# Patient Record
Sex: FEMALE | Race: WHITE | Employment: OTHER | URBAN - NONMETROPOLITAN AREA
[De-identification: names, ages, dates, MRNs, and addresses within clinical notes are randomized per-mention and may not be internally consistent; named-entity substitution may affect disease eponyms.]

---

## 2017-10-05 ENCOUNTER — NURSING HOME VISIT (OUTPATIENT)
Dept: FAMILY MEDICINE | Facility: CLINIC | Age: 82
End: 2017-10-05
Payer: COMMERCIAL

## 2017-10-05 VITALS
SYSTOLIC BLOOD PRESSURE: 142 MMHG | BODY MASS INDEX: 25.45 KG/M2 | DIASTOLIC BLOOD PRESSURE: 74 MMHG | OXYGEN SATURATION: 93 % | HEART RATE: 76 BPM | RESPIRATION RATE: 20 BRPM | TEMPERATURE: 97.5 F | WEIGHT: 138 LBS

## 2017-10-05 DIAGNOSIS — I10 HTN (HYPERTENSION), BENIGN: ICD-10-CM

## 2017-10-05 DIAGNOSIS — J41.8 MIXED SIMPLE AND MUCOPURULENT CHRONIC BRONCHITIS (H): Primary | ICD-10-CM

## 2017-10-05 DIAGNOSIS — M62.81 GENERALIZED MUSCLE WEAKNESS: ICD-10-CM
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
## 2017-10-05 DIAGNOSIS — S42.002D CLOSED NONDISPLACED FRACTURE OF LEFT CLAVICLE WITH ROUTINE HEALING, UNSPECIFIED PART OF CLAVICLE, SUBSEQUENT ENCOUNTER: ICD-10-CM
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
## 2017-10-05 PROBLEM — J45.30 MILD PERSISTENT ASTHMA WITHOUT COMPLICATION: Status: ACTIVE | Noted: 2017-09-05

## 2017-10-05 PROBLEM — S22.080D COMPRESSION FRACTURE OF TWELFTH THORACIC VERTEBRA WITH ROUTINE HEALING: Status: ACTIVE | Noted: 2017-09-05

## 2017-10-05 PROBLEM — S42.009A CLAVICLE FRACTURE: Status: ACTIVE | Noted: 2017-09-05

## 2017-10-05 PROCEDURE — 99310 SBSQ NF CARE HIGH MDM 45: CPT | Performed by: NURSE PRACTITIONER

## 2017-10-05 RX ORDER — ALBUTEROL SULFATE 0.83 MG/ML
1 SOLUTION RESPIRATORY (INHALATION) EVERY 6 HOURS PRN
COMMUNITY
End: 2017-12-19

## 2017-10-05 RX ORDER — SENNOSIDES 8.6 MG
1 TABLET ORAL 2 TIMES DAILY PRN
COMMUNITY

## 2017-10-05 RX ORDER — IPRATROPIUM BROMIDE AND ALBUTEROL SULFATE 2.5; .5 MG/3ML; MG/3ML
1 SOLUTION RESPIRATORY (INHALATION) 2 TIMES DAILY
COMMUNITY
End: 2017-10-18

## 2017-10-05 NOTE — PROGRESS NOTES
[facility]  PRIMARY CARE PROVIDER AND CLINIC:  Tania Grullon [facility]  [address] [facility] [address] / [place] [address]  Chief Complaint   Patient presents with     Clinic Care Coordination - Initial       HPI:    Benson Kapoor is a 89 year old  (5/20/1925),admitted to the [facility]  from [facility] on 10/4/17..  Last hospital stay 9/5/17  through 9/6/17 at [facility].  Admitted to this facility for  rehab, medical management and nursing care.  HPI information obtained from: facility chart records, facility staff, patient report and Care Everywhere [facility] chart review.  Current issues are:      .   BRIEF HOSPITAL COURSE from [facility] Records: This 89 y.o. female with a history of hypertension, asthma presented with a fall today. She notes that she went to the bathroom to use the toilet. She has had some urinary frequency. She was trying to transfer from the toilet ot the wheelchair and the chair tipped and she fell. Her son was able to get her up. She has been feeling weaker than usual. She was not able to ambulate - is is usually able to walk in the home. She was brought in due to increased weakness. She had pains in the left arm and clavicle.      In ED she was noted to have a UTI. SHe had CT scanning done that showed a left clavicle fracture and also a new appearing T12 compression fracture. She was admitted to observation status for treatment of the uti and pain management.     Following admission, she was started on prednisone and nebs for concerns of wheezing. She has a history of asthma, but this may also represent COPD based on her age. Today, she has no wheezing or shortness of breath at all. Will treat with 4 more days of prednisone and use the nebs as needed. She will need two more doses of levofloxacin for the UTI. Her pain is actually quite minimal from the clavicle fracture. She is not complaining of back pain, so the  compression fracture may be old.     She is not able to care for herself at home. Her family is not able to care for her well. She would prefer to be in a nursing home. She wanted to go to the nursing home in [place], but the beds are full there so she has acceptance in [place] and more reluctantly consents to go there. She just wanted to be closer to her family. She will be [facility] to [facility] in stable condition.       She was transferred from [facility] to [place] to complete her rehab and be closer to her family   She is doing well  Denies pain       CODE STATUS/ADVANCE DIRECTIVES DISCUSSION:   DNR / DNI  Patient's living condition: lives [place] family, 2 sons [place]     ALLERGIES:Review of patient's allergies indicates no known allergies.  PAST MEDICAL HISTORY:  has a past medical history of A fib; Anemia due to blood loss, acute (9/5/2012); DJD (degenerative joint disease); and HTN.  PAST SURGICAL HISTORY:  has a past surgical history that includes surgical history of - and hysterectomy, cervix status unknown.  FAMILY HISTORY: family history is not on file.  SOCIAL HISTORY:  reports that she has never smoked. She has never used smokeless tobacco. She reports that she does not drink alcohol or use illicit drugs.    Post Discharge Medication Reconciliation Status: discharge medications reconciled and changed, per note/orders (see AVS).  Current Outpatient Prescriptions   Medication Sig Dispense Refill     albuterol (2.5 MG/3ML) 0.083% neb solution Take 1 vial by nebulization every 6 hours as needed for shortness of breath / dyspnea or wheezing       ipratropium - albuterol 0.5 mg/2.5 mg/3 mL (DUONEB) 0.5-2.5 (3) MG/3ML neb solution Take 1 vial by nebulization 2 times daily And every 4hrs PRN       OXYCODONE HCL PO Take 5mg by mouth every 6hrs for pain 1-5/10 and 10mg for pain 6-10/10       DULoxetine HCl (CYMBALTA PO) Take 30 mg by mouth daily       sennosides (SENOKOT) 8.6 MG  tablet Take 1 tablet by mouth daily       Magnesium Hydroxide (MILK OF MAGNESIA PO) Take 30 mLs by mouth as needed.       Calcium-Vitamin D (CALTRATE 600 PLUS-VIT D OR) Take 1 tablet by mouth 2 times daily.       alum & mag hydroxide-simethicone (MYLANTA/MAALOX) 200-200-20 MG/5ML SUSP Take 30 mLs by mouth every 2 hours as needed        acetaminophen (TYLENOL EX ST ARTHRITIS PAIN) 500 MG tablet Take 650 mg by mouth 3 times daily And 650 mg every 4 hours prn         ROS:  10 point ROS of systems including Constitutional, Eyes, Respiratory, Cardiovascular, Gastroenterology, Genitourinary, Integumentary, Muscularskeletal, Psychiatric were all negative except for pertinent positives noted in my HPI.    Exam:  /74  Pulse 76  Temp 97.5  F (36.4  C)  Resp 20  Wt 138 lb (62.6 kg)  SpO2 93%  BMI 25.45 kg/m2  GENERAL APPEARANCE:  Alert, in no distress  EYES:  EOM, conjunctivae, lids, pupils and irises normal  RESP:  respiratory effort and palpation of chest normal, lungs clear to auscultation , no respiratory distress  CV:  Palpation and auscultation of heart done , regular rate and rhythm, no murmur, rub, or gallop  ABDOMEN:  normal bowel sounds, soft, nontender, no hepatosplenomegaly or other masses  M/S:   Gait and station abnormal sitting in wheelchair, can ambulate with assistance  Digits and nails normal  SKIN:  Inspection of skin and subcutaneous tissue baseline  NEURO:   Cranial nerves 2-12 are normal tested and grossly at patient's baseline  PSYCH:  normal insight, judgement and memory, affect and mood normal    Lab/Diagnostic data:      CBC WITH AUTO DIFFERENTIAL (09/06/2017 4:30 AM)  Only the most recent of 2 results within the time period is included.    CBC WITH AUTO DIFFERENTIAL (09/06/2017 4:30 AM)   Component Value Ref Range   WHITE BLOOD COUNT 6.9 3.4 - 9.8 thou/cu mm   RED BLOOD COUNT 4.39 4.09 - 5.34 mil/cu mm   HEMOGLOBIN 13.6 12.0 - 16.0 g/dL   HEMATOCRIT 41.1 37.0 - 47.0 %   MCV 94 80 - 97 fL    MCH 31.0 27.2 - 32.8 pg   MCHC 33.1 32.0 - 36.0 g/dL   RDW 13.4 11.5 - 14.5 %   PLATELET COUNT 161 150 - 450 thou/cu mm   MPV 11.2 8.5 - 12.0 fL   NRBC 0.0 <=0.0 %   NEUTROPHILS 80.1 (H) 42.4 - 72.3 %   LYMPHOCYTES 10.6 (L) 16.6 - 42.8 %   MONOCYTES 7.8 4.8 - 13.6 %   EOSINOPHILS 0.9 0.0 - 6.7 %   BASOPHILS 0.3 <1.1 %   IMMATURE GRANULOCYTES(METAS,MYELOS,PROS) 0.3 0.0 - 0.4 %   ABSOLUTE NEUTROPHILS 5.5 1.4 - 6.2 thou/cu mm   ABSOLUTE LYMPHOCYTES 0.7 (L) 0.8 - 3.1 thou/cu mm   ABSOLUTE MONOCYTES 0.5 0.2 - 1.0 thou/cu mm   ABSOLUTE EOSINOPHILS 0.06 0.00 - 0.43 thou/cu mm   ABSOLUTE BASOPHILS 0.02 <0.07 thou/cu mm   ABSOLUTE IMMATURE GRANULOCYTES(METAS,MYELOS,PROS) 0.02 <0.03 thou/cu mm     CBC WITH AUTO DIFFERENTIAL (09/06/2017 4:30 AM)   Specimen Performing Laboratory   Blood [place] [address] [facility] [address]    [place] [address]     Back to top of Results      VITAMIN D 25 (DEFICIENCY) (09/06/2017 4:30 AM)  VITAMIN D 25 (DEFICIENCY) (09/06/2017 4:30 AM)   Component Value Ref Range   VITAMIN D TOTAL AFL 33.7 30.0 - 80.0 ng/mL     VITAMIN D 25 (DEFICIENCY) (09/06/2017 4:30 AM)   Specimen Performing Laboratory   Blood [facility]    [address]    [place] [address]       VITAMIN D 25 (DEFICIENCY) (09/06/2017 4:30 AM)   Narrative   Deficiency:             < 20 ng/mL     Insufficiency:       20 - 29 ng/mL     Sufficiency:         30 - 80 ng/mL    Possible Toxicity:      > 80 ng/mL      Back to top of Results      TROPONIN I (09/06/2017 4:30 AM)  Only the most recent of 3 results within the time period is included.    TROPONIN I (09/06/2017 4:30 AM)   Component Value Ref Range   TROPONIN I FLH 0.043 <0.056 ng/mL     TROPONIN I (09/06/2017 4:30 AM)   Specimen Performing Laboratory   Blood [facility]    [address]    [place] [address]     Back to top of Results      CBC and Differential (09/06/2017 4:30 AM)  Only the most recent of 2 results within the time period is  included.    CBC and Differential (09/06/2017 4:30 AM)   Specimen Performing Laboratory   Blood        CBC and Differential (09/06/2017 4:30 AM)   Narrative   The following orders were created for panel order CBC and Differential.    Procedure                               Abnormality         Status                       ---------                               -----------         ------                       CBC WITH AUTO DIFFERENTIAL[[id number]]   Abnormal            Final result                     Please view results for these tests on the individual orders.     Back to top of Results      Magnesium (09/06/2017 4:30 AM)  Magnesium (09/06/2017 4:30 AM)   Component Value Ref Range   MAGNESIUM 1.6 (L) 1.8 - 2.4 mg/dL     Magnesium (09/06/2017 4:30 AM)   Specimen Performing Laboratory   Blood [facility]    [address]    [place] [address]     Back to top of Results      Basic Metabolic Panel (09/06/2017 4:30 AM)  Only the most recent of 2 results within the time period is included.    Basic Metabolic Panel (09/06/2017 4:30 AM)   Component Value Ref Range   SODIUM 143 136 - 145 mmol/L   POTASSIUM 3.8 3.5 - 5.1 mmol/L   CHLORIDE 105 98 - 107 mmol/L   CO2,TOTAL 29 21 - 32 mmol/L   ANION GAP 9 5 - 15   GLUCOSE 100 70 - 100 mg/dL   CALCIUM 8.5 8.3 - 9.9 mg/dL   BUN 19 (H) 7 - 18 mg/dL   CREATININE 0.71 0.60 - 1.30 mg/dL   BUN/CREAT RATIO  27 (H) 12 - 20    GFR if African American >60 >60 ml/min/1.73m2   GFR if not African American >60 >60 ml/min/1.73m2     Basic Metabolic Panel (09/06/2017 4:30 AM)   Specimen Performing Laboratory   Blood [facility]    [address]    [place] [address]     Back to top of Results      MRSA DNA PCR (09/05/2017 8:20 PM)  MRSA DNA PCR (09/05/2017 8:20 PM)   Component Value Ref Range   MRSA DNA PCR Negative Negative     MRSA DNA PCR (09/05/2017 8:20 PM)   Specimen Performing Laboratory   Other - Nasal [facility]    [address]     [place] [address]     Back to top of Results      CT SPINE CERVICAL WO (09/05/2017 5:38 PM)  CT SPINE CERVICAL WO (09/05/2017 5:38 PM)   Narrative   INDICATION:    Fall        TECHNIQUE:    CT cervical spine without contrast.        COMPARISON:    None available        FINDINGS:    There is an exaggerated cervicothoracic curvature. The craniocervical and atlantoaxial alignments are near anatomical. There is no definite evidence of an acute cervical spine fracture. There is mild anterior compression of the T2 and T3 vertebral bodies which could be chronic. There is a fracture of the medial left clavicle. There is no significant precervical soft tissue swelling. There are degenerative changes at multiple levels.  Multiple thyroid low-density lesions are seen, as well as right thyroid calcifications.         IMPRESSION:    No definite evidence of an acute cervical spine fracture. Mild anterior compression deformities of the T2 and T3 vertebral bodies could be chronic. A fracture of the medial left clavicle.     Multiple thyroid lesions and calcifications.  Followup with sonography.        Please note that all CT scans at this facility use dose modulation, iterative reconstruction, and/or weight-based dosing when appropriate to reduce radiation dose to as low as reasonably achievable.        Dictated by Ciro May MD @ Sep  5 2017  6:07PM        Signed by Dr. Ciro May @ Sep  5 2017  6:23PM       CT SPINE CERVICAL WO (09/05/2017 5:38 PM)   Procedure Note   Interface, Powerscribe - 09/05/2017 6:24 PM CDT    INDICATION:  Fall    TECHNIQUE:  CT cervical spine without contrast.    COMPARISON:  None available    FINDINGS:  There is an exaggerated cervicothoracic curvature. The craniocervical and atlantoaxial alignments are near anatomical. There is no definite evidence of an acute cervical spine fracture. There is mild anterior compression of the T2 and T3 vertebral bodies which could be chronic. There is a fracture of the  medial left clavicle. There is no significant precervical soft tissue swelling. There are degenerative changes at multiple levels. Multiple thyroid low-density lesions are seen, as well as right thyroid calcifications.     IMPRESSION:  No definite evidence of an acute cervical spine fracture. Mild anterior compression deformities of the T2 and T3 vertebral bodies could be chronic. A fracture of the medial left clavicle.   Multiple thyroid lesions and calcifications. Followup with sonography.    Please note that all CT scans at this facility use dose modulation, iterative reconstruction, and/or weight-based dosing when appropriate to reduce radiation dose to as low as reasonably achievable.    Dictated by Ciro May MD @ Sep 5 2017 6:07PM    Signed by Dr. Ciro May @ Sep 5 2017 6:23PM     Back to top of Results      CT HEAD BRAIN WO (09/05/2017 5:36 PM)  CT HEAD BRAIN WO (09/05/2017 5:36 PM)   Narrative   INDICATION:    Fall        TECHNIQUE:    CT head without contrast.        COMPARISON:    None available         FINDINGS:    This study is degraded by artifact.     The ventricles and sulci demonstrate normal configuration and size for the patient`s age.  There is no mass effect or midline shift.  White matter hypodensities are suggestive of chronic small vessel ischemic changes. There is no definite evidence of a gross acute intracranial hemorrhage or loss of gray-white differentiation.     No acute calvarial fracture is seen.     The visualized paranasal sinuses and mastoid air cells are clear. There are postsurgical changes in both globes.         IMPRESSION:    Artifact degraded study without definite evidence of a gross acute intracranial hemorrhage or loss of gray-white differentiation.  If indicated clinically, followup with better sedation and technique.        Please note that all CT scans at this facility use dose modulation, iterative reconstruction, and/or weight-based dosing when appropriate to reduce  radiation dose to as low as reasonably achievable.        Dictated by Ciro May MD @ Sep  5 2017  6:07PM        Signed by Dr. Ciro May @ Sep  5 2017  6:13PM       CT HEAD BRAIN WO (09/05/2017 5:36 PM)   Procedure Note   Interface, Powerscribe - 09/05/2017 6:13 PM CDT    INDICATION:  Fall    TECHNIQUE:  CT head without contrast.    COMPARISON:  None available     FINDINGS:  This study is degraded by artifact.   The ventricles and sulci demonstrate normal configuration and size for the patient`s age. There is no mass effect or midline shift. White matter hypodensities are suggestive of chronic small vessel ischemic changes. There is no definite evidence of a gross acute intracranial hemorrhage or loss of gray-white differentiation.   No acute calvarial fracture is seen.   The visualized paranasal sinuses and mastoid air cells are clear. There are postsurgical changes in both globes.     IMPRESSION:  Artifact degraded study without definite evidence of a gross acute intracranial hemorrhage or loss of gray-white differentiation. If indicated clinically, followup with better sedation and technique.    Please note that all CT scans at this facility use dose modulation, iterative reconstruction, and/or weight-based dosing when appropriate to reduce radiation dose to as low as reasonably achievable.    Dictated by Ciro May MD @ Sep 5 2017 6:07PM    Signed by Dr. Ciro May @ Sep 5 2017 6:13PM     Back to top of Results      CT CHEST ABDOMEN PELVIS W (09/05/2017 5:27 PM)  CT CHEST ABDOMEN PELVIS W (09/05/2017 5:27 PM)   Impressions   A medial left clavicular fracture. Multiple spinal compression     deformities, with a suspected acute component in the T12 compression     deformity.     No evidence of a gross acute visceral or vascular injury in the chest,     abdomen or pelvis.     Cholelithiasis. An 8 millimeter soft tissue density at the gallbladder     wall, nonspecific. Correlate with sonographic evaluation.     Other  findings as above.        Please note that all CT scans at this facility use dose modulation,     iterative reconstruction, and/or weight-based dosing when appropriate to     reduce radiation dose to as low as reasonably achievable.        Dictated by Ciro May MD @ Sep 5 2017 6:23PM    Signed by: Ciro May MD @9/5/2017 6:42:12 PM               CT CHEST ABDOMEN PELVIS W (09/05/2017 5:27 PM)   Narrative   INDICATION:    Fall        TECHNIQUE:    CT chest, abdomen and pelvis acquired with IV contrast.        COMPARISON:    None available        FINDINGS:    Chest:    Cardiovascular structures: Normal cardiac size. Mild ectasia of the     ascending aorta measuring 3.7 centimeters. Atherosclerotic changes.     Mediastinum and latonia: No mass or adenopathy. A small hiatal hernia.     Lungs: No consolidation. No pneumothorax. Apparent subtle ill-defined     right lower lung ground-glass opacities could be related to minor     compressive changes.     Pleura and pericardium: No effusions.     Chest wall and axilla: No mass or adenopathy. Thyroid lesions and     calcifications.     Bones: A medial left clavicular fracture. Chronic rib deformities.     Anterior spinal ankylosis. A severe anterior compression deformity of the     T12 vertebral body demonstrating a lucent area seen on the sagittal     reformats, which is concerning for an acute component. Degenerative     changes in both shoulders.     Abdomen and Pelvis:    Liver: A subcentimeter left hepatic low-density lesion, too small to     characterize.     Spleen: Unremarkable.     Pancreas: Unremarkable.     Gallbladder and bile ducts: Cholelithiasis with borderline gallbladder     wall prominence which could be related to incomplete distention. An 8     millimeter soft tissue nodule along the gallbladder wall on image 151.     Pericholecystic hypodensity appears to be related to fat. A dilated CBD     measuring 1.2 centimeters in diameter.     Adrenal glands: Mild  adrenal thickening.     Kidneys: No hydronephrosis. Irregular densities in the renal lower pole of     these could represent nonobstructive calcifications or early contrast     excretion.     GI tract: No high-grade mechanical bowel obstruction. Fluid-filled small     bowel segments are nonspecific. The appendix is not seen. No significant     pericolonic changes. Stool throughout the colon.     Vascular structures: Arthrosclerotic changes.     Lymph nodes: Unremarkable.     Miscellaneous: Unremarkable. No free air or significant free fluid.     Pelvic Organs: Obscured by artifact. The uterus is not seen.     Bones: A left hip arthroplasty and a right dynamic hip screw. Multiple     lumbar compression deformities, most pronounced at the L2 level.          Back to top of Results    EKG 12 LEAD (09/05/2017 4:45 PM)  Back to top of Results      LACTATE,BLOOD (09/05/2017 4:35 PM)  LACTATE,BLOOD (09/05/2017 4:35 PM)   Component Value Ref Range   LACTATE,BLOOD 1.1 0.4 - 2.0 mmol/L     LACTATE,BLOOD (09/05/2017 4:35 PM)   Specimen Performing Laboratory   Blood [facility]    [address]    [place] [address]     Back to top of Results      AMMONIA (09/05/2017 4:35 PM)  AMMONIA (09/05/2017 4:35 PM)   Component Value Ref Range   AMMONIA 11 11 - 35 umol/L     AMMONIA (09/05/2017 4:35 PM)   Specimen Performing Laboratory   Blood [facility]    [address]    [place] [address]     Back to top of Results      HEPATIC FUNCTION PANEL (09/05/2017 4:35 PM)  HEPATIC FUNCTION PANEL (09/05/2017 4:35 PM)   Component Value Ref Range   ALBUMIN 3.5 3.4 - 5.0 g/dL   PROTEIN,TOTAL 7.7 6.4 - 8.2 g/dL   GLOBULIN  4.2 (H) 2.3 - 3.5 g/dL   A/G RATIO 0.8 (L) 1.0 - 2.0    BILIRUBIN,TOTAL 0.9 0.0 - 1.0 mg/dL   BILIRUBIN,DIRECT 0.3 0.0 - 0.3 mg/dL   BILIRUBIN,INDIRECT  0.6 <=0.9 mg/dL   ALT (SGPT) FLH 25 12 - 65 IU/L   AST (SGOT) FLH 35 15 - 37 IU/L   ALK PHOSPHATASE FLH 89 46 - 116 IU/L     HEPATIC FUNCTION  PANEL (09/05/2017 4:35 PM)   Specimen Performing Laboratory   Blood [facility]    [address]    [place] [address]     Back to top of Results      URINALYSIS MICROSCOPIC (09/05/2017 4:10 PM)  URINALYSIS MICROSCOPIC (09/05/2017 4:10 PM)   Component Value Ref Range   RBC 3-5 (A) 0-2, None Seen /HPF   WBC 26-50 (A) 0-2, 3-5, None Seen /HPF   BACTERIA  Moderate (A) None Seen, Rare, Occasional, Few Bacteria/HPF     URINALYSIS MICROSCOPIC (09/05/2017 4:10 PM)   Specimen Performing Laboratory   Urine [facility]    [address]    [place] [address]     Back to top of Results      URINALYSIS W REFLEX MICROSCOPIC IF POSITIVE (09/05/2017 4:10 PM)  URINALYSIS W REFLEX MICROSCOPIC IF POSITIVE (09/05/2017 4:10 PM)   Component Value Ref Range   COLOR  Yellow Yellow Color   CLARITY  Slightly Cloudy (A) Clear Clarity   SPECIFIC GRAVITY,URINE  1.010 1.010, 1.015, 1.020, 1.025    PH,URINE  >=9.0 (A) 6.0, 7.0, 8.0, 5.5, 6.5, 7.5, 8.5    UROBILINOGEN,QUALITATIVE  Normal Normal EU/dl   PROTEIN, URINE 100 (A) Negative mg/dL   GLUCOSE, URINE Negative Negative mg/dL   KETONES,URINE  Negative Negative mg/dL   BILIRUBIN,URINE  Negative Negative    OCCULT BLOOD,URINE  Large (A) Negative    NITRITE  Positive (A) Negative    LEUKOCYTE ESTERASE  Large (A) Negative      URINALYSIS W REFLEX MICROSCOPIC IF POSITIVE (09/05/2017 4:10 PM)   Specimen Performing Laboratory   Urine [facility]    [address]    [place] [address]     Back to top of Results      XR HIP 2 OR 3 VIEWS W PELVIS LEFT (09/04/2017 3:46 PM)  XR HIP 2 OR 3 VIEWS W PELVIS LEFT (09/04/2017 3:46 PM)   Narrative   Indication:    Fell.  Left hip injury.        Technique:    AP view of the pelvis as well as AP and lateral projections of the left hip.        Comparison:    11/14/2012.        Findings:    No acute fracture. Left total hip arthroplasty, as before. Subtle acetabular protrusion, increased since 2012.  Prosthetic  components well-seated and aligned.  Compression screw and plate at the right hip, as before.  Moderate to advanced right hip osteoarthritis, as before.  Moderate osteoarthritis at the symphysis pubis and sacroiliac joints.        Impression:    Negative for acute traumatic abnormality.            Dictated by Oz Gaffney MD @ Sep  4 2017  4:03PM        Signed by Dr. Oz Gaffney @ Sep  4 2017  4:06PM       XR HIP 2 OR 3 VIEWS W PELVIS LEFT (09/04/2017 3:46 PM)   Procedure Note   Interface, Powerscribe - 09/04/2017 4:06 PM CDT    Indication:  Fell. Left hip injury.    Technique:  AP view of the pelvis as well as AP and lateral projections of the left hip.    Comparison:  11/14/2012.    Findings:  No acute fracture. Left total hip arthroplasty, as before. Subtle acetabular protrusion, increased since 2012. Prosthetic components well-seated and aligned. Compression screw and plate at the right hip, as before. Moderate to advanced right hip osteoarthritis, as before. Moderate osteoarthritis at the symphysis pubis and sacroiliac joints.    Impression:  Negative for acute traumatic abnormality.      Dictated by Oz Gaffney MD @ Sep 4 2017 4:03PM    Signed by Dr. Oz Gaffney @ Sep 4 2017 4:06PM     Back to top of Results      XR SHOULDER 3 VIEWS LEFT (09/04/2017 3:46 PM)  XR SHOULDER 3 VIEWS LEFT (09/04/2017 3:46 PM)   Narrative   Indication:    Left arm injury.        Technique:    Three views of the left shoulder.        Comparison:    08/27/2012.        Findings:    No fracture or dislocation.  Osteoporosis.  Presumed advanced degenerative changes at the glenohumeral joint and at least mild osteoarthritis at the AC joint.        Impression:    1. Negative for acute traumatic abnormality.    2. Degenerative changes and osteoporosis.            Dictated by Oz Gaffney MD @ Sep  4 2017  4:01PM        Signed by Dr. Oz Gaffney @ Sep  4 2017  4:02PM       XR SHOULDER 3 VIEWS LEFT (09/04/2017 3:46 PM)   Procedure Note    Interface, Powerscribe - 09/04/2017 4:04 PM CDT    Indication:  Left arm injury.    Technique:  Three views of the left shoulder.    Comparison:  08/27/2012.    Findings:  No fracture or dislocation. Osteoporosis. Presumed advanced degenerative changes at the glenohumeral joint and at least mild osteoarthritis at the AC joint.    Impression:  1. Negative for acute traumatic abnormality.  2. Degenerative changes and osteoporosis.      Dictated by Oz Gaffney MD @ Sep 4 2017 4:01PM    Signed by Dr. 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 patient is stable and continue without pharmacological invention with routine assessment.         Orders:  1.  DC  Colace  2.  Start Senna S 1 tab daily for constipation    (colace is not very effective by itself for treatment of constipation, will add senna and monitor)     Total time spent with patient visit at the skilled nursing facility was 35 min including patient visit and review of past records. Greater than 50% of total time spent with counseling and coordinating care.      Electronically signed by:  Rajni Shah NP

## 2017-10-10 ENCOUNTER — NURSING HOME VISIT (OUTPATIENT)
Dept: FAMILY MEDICINE | Facility: CLINIC | Age: 82
End: 2017-10-10
Payer: COMMERCIAL

## 2017-10-10 VITALS
OXYGEN SATURATION: 91 % | RESPIRATION RATE: 17 BRPM | SYSTOLIC BLOOD PRESSURE: 114 MMHG | WEIGHT: 139.5 LBS | HEART RATE: 92 BPM | DIASTOLIC BLOOD PRESSURE: 73 MMHG | TEMPERATURE: 97.4 F | BODY MASS INDEX: 25.72 KG/M2

## 2017-10-10 DIAGNOSIS — K62.5 RECTAL BLEEDING: Primary | ICD-10-CM

## 2017-10-10 DIAGNOSIS — K64.4 EXTERNAL HEMORRHOIDS: ICD-10-CM

## 2017-10-10 PROCEDURE — 99309 SBSQ NF CARE MODERATE MDM 30: CPT | Performed by: NURSE PRACTITIONER

## 2017-10-10 NOTE — MR AVS SNAPSHOT
"              After Visit Summary   10/10/2017    Benson Kapoor    MRN: [id number]           Patient Information     Date Of Birth          5/20/1925        Visit Information        Provider Department      10/10/2017 8:40 AM Rajni Shah NP [facility]        Today's Diagnoses     Rectal bleeding    -  1    External hemorrhoids           Follow-ups after your visit        Your next 10 appointments already scheduled     Oct 18, 2017  1:00 PM CDT   Nursing Home with Leida Johnson MD   [facility] ([facility])    [address] [place] [id number] [phone]              Who to contact     If you have questions or need follow up information about today's clinic visit or your schedule please contact [facility] directly at [phone].  Normal or non-critical lab and imaging results will be communicated to you by Chainhart, letter or phone within 4 business days after the clinic has received the results. If you do not hear from us within 7 days, please contact the clinic through MyChart or phone. If you have a critical or abnormal lab result, we will notify you by phone as soon as possible.  Submit refill requests through Chibwe or call your pharmacy and they will forward the refill request to us. Please allow 3 business days for your refill to be completed.          Additional Information About Your Visit        Chainhart Information     Chibwe lets you send messages to your doctor, view your test results, renew your prescriptions, schedule appointments and more. To sign up, go to [email or web address] . Click on \"Log in\" on the left side of the screen, which will take you to the Welcome page. Then click on \"Sign up Now\" on the right side of the page.     You will be asked to enter the access code listed below, as well as some personal information. Please follow the directions to create your username and password.     Your access code " is: [email or web address]  Expires: 1/10/2018  2:37 PM     Your access code will  in 90 days. If you need help or a new code, please call your [facility] clinic or [phone].        Care EveryWhere ID     This is your Care EveryWhere ID. This could be used by other organizations to access your [facility] medical records  [id number]        Your Vitals Were     Pulse Temperature Respirations Pulse Oximetry BMI (Body Mass Index)       92 97.4  F (36.3  C) 17 91% 25.72 kg/m2        Blood Pressure from Last 3 Encounters:   10/10/17 114/73   10/05/17 142/74   13 101/54    Weight from Last 3 Encounters:   10/10/17 139 lb 8 oz (63.3 kg)   10/05/17 138 lb (62.6 kg)   13 147 lb (66.7 kg)              Today, you had the following     No orders found for display         Today's Medication Changes          These changes are accurate as of: 10/10/17 11:59 PM.  If you have any questions, ask your nurse or doctor.               Stop taking these medicines if you haven't already. Please contact your care team if you have questions.     OXYCODONE HCL PO                    Primary Care Provider Office Phone # Fax #    Nppxcjz Stephy Grullon [phone]       [address] [address]        Equal Access to Services     [facility]: Hadii cristóbal Diaz, waaxda norma, qaybta bernice coyle . So [facility] [phone].    ATENCIÓN: Si habla español, tiene a isaac disposición servicios gratuitos de asistencia lingüística. Llame al [phone].    We comply with applicable federal civil rights laws and [place] laws. We do not discriminate on the basis of race, color, national origin, age, disability, sex, sexual orientation, or gender identity.            Thank you!     Thank you for choosing [facility]  for your care. Our goal is always to provide you with excellent care. Hearing back from our patients is one  way we can continue to improve our services. Please take a few minutes to complete the written survey that you may receive in the mail after your visit with us. Thank you!             Your Updated Medication List - Protect others around you: Learn how to safely use, store and throw away your medicines at www.disposemymeds.org.          This list is accurate as of: 10/10/17 11:59 PM.  Always use your most recent med list.                   Brand Name Dispense Instructions for use Diagnosis    albuterol (2.5 MG/3ML) 0.083% neb solution      Take 1 vial by nebulization every 6 hours as needed for shortness of breath / dyspnea or wheezing        alum & mag hydroxide-simethicone 200-200-20 MG/5ML Susp suspension    MYLANTA/MAALOX     Take 30 mLs by mouth every 2 hours as needed        CALTRATE 600 PLUS-VIT D OR      Take 1 tablet by mouth 2 times daily.        CYMBALTA PO      Take 30 mg by mouth daily        ipratropium - albuterol 0.5 mg/2.5 mg/3 mL 0.5-2.5 (3) MG/3ML neb solution    DUONEB     Take 1 vial by nebulization 2 times daily And every 4hrs PRN        MILK OF MAGNESIA PO      Take 30 mLs by mouth as needed.        sennosides 8.6 MG tablet    SENOKOT     Take 1 tablet by mouth daily        TYLENOL EX ST ARTHRITIS PAIN 500 MG tablet   Generic drug:  acetaminophen      Take 650 mg by mouth 3 times daily And 650 mg every 4 hours prn

## 2017-10-10 NOTE — PROGRESS NOTES
[facility]    Chief Complaint   Patient presents with     Nursing Home Acute       HPI:    Benson Kapoor is a 89 year old  (5/20/1925), who is being seen today for an episodic care visit at [facility].    HPI information obtained from: facility chart records, facility staff and patient report. Today's concern is:    Asked by nursing to see patient as they felt that she has a prolapsed rectum   Noted some blood in her stool   Patient reports that she has a long standing history of this   She denies any rectal pain   Reports occasional constipation        REVIEW OF SYSTEMS:  4 point ROS including Respiratory, CV, GI and , other than that noted in the HPI,  is negative    /73  Pulse 92  Temp 97.4  F (36.3  C)  Resp 17  Wt 139 lb 8 oz (63.3 kg)  SpO2 91%  BMI 25.72 kg/m2  GENERAL APPEARANCE:  Alert, in no distress  RESP:  respiratory effort and palpation of chest normal, auscultation of lungs clear, no respiratory distress  CV:  Palpation and auscultation of heart done , rate and rhythm WNL, no murmur, no peripheral edema  ABDOMEN:  normal bowel sounds, soft, nontender, no hepatosplenomegaly or other masses  RECTAL: no prolapsed rectum appreciated , external hemorrhoids noted on exam     ASSESSMENT/PLAN:  Rectal bleeding    External hemorrhoids         Orders:  1.  DC Oxycodone--not using  2.  HgB (rectal bleeding), BMP (CHF) next lab day  Total time spent with patient visit at the skilled nursing facility was 25 min including patient visit and review of past records. Greater than 50% of total time spent with counseling and coordinating care due to impaired memory.    Rajni Shah NP

## 2017-10-12 ENCOUNTER — HOSPITAL LABORATORY (OUTPATIENT)
Facility: OTHER | Age: 82
End: 2017-10-12

## 2017-10-12 LAB
ANION GAP SERPL CALCULATED.3IONS-SCNC: 5 MMOL/L (ref 3–14)
BUN SERPL-MCNC: 12 MG/DL (ref 7–30)
CALCIUM SERPL-MCNC: 8.8 MG/DL (ref 8.5–10.1)
CHLORIDE SERPL-SCNC: 103 MMOL/L (ref 94–109)
CO2 SERPL-SCNC: 30 MMOL/L (ref 20–32)
CREAT SERPL-MCNC: 0.48 MG/DL (ref 0.52–1.04)
GFR SERPL CREATININE-BSD FRML MDRD: >90 ML/MIN/1.7M2
GLUCOSE SERPL-MCNC: 84 MG/DL (ref 70–99)
HGB BLD-MCNC: 12.8 G/DL (ref 11.7–15.7)
POTASSIUM SERPL-SCNC: 4.3 MMOL/L (ref 3.4–5.3)
SODIUM SERPL-SCNC: 138 MMOL/L (ref 133–144)

## 2017-10-16 VITALS
RESPIRATION RATE: 18 BRPM | SYSTOLIC BLOOD PRESSURE: 132 MMHG | OXYGEN SATURATION: 93 % | HEART RATE: 83 BPM | WEIGHT: 129.6 LBS | TEMPERATURE: 97.6 F | BODY MASS INDEX: 23.9 KG/M2 | DIASTOLIC BLOOD PRESSURE: 72 MMHG

## 2017-10-16 NOTE — PROGRESS NOTES
[facility]  PRIMARY CARE PROVIDER AND CLINIC:  Yadi Tania Keyes  [facility]  [address] [place] [address]  Chief Complaint   Patient presents with     Clinic Care Coordination - Initial       HPI:    Benson Kapoor is a 89 year old  (5/20/1925),admitted to the [facility]  from [facility] on 10/4/17. Last Hospital stay 9/5/17  through 9/6/17 at [facility].  Admitted to this facility for  rehab, medical management and nursing care.  HPI information obtained from: patient report and [facility] chart review.      Interval History:  - Pt with significant PMH of HTN, fell at home, sustained a fx to left clavicle and acute T12 and T1-T2 (? Chronic) compression fx,  admitted to the above hospital, was found to have UTI- treated with levaquin. Fx managed conservatively.   - Hospitalization was c/w Resp. sx started on neb and prednisone  - Transferred to [facility] then to this facility for rehab.  At TCU:  - blood with stool, reportedly has chronic hx, on exam no rectal prolapse noted. Hb came back 12.8.     Current issues are:      - Seen and examined.   - Reports no pain now, neither in the shoulder or back.   - Reports wheezing if miss nebs. No chest pain, reports occasional cough.   - reports sleep, appetite are fine. BM is too soft and has to rush to the bathroom .   - reports therapy is going well, self propel.     CODE STATUS/ADVANCE DIRECTIVES DISCUSSION:   DNR / DNI  Patient's living condition: lives with family, [place]     ALLERGIES:Review of patient's allergies indicates no known allergies.  PAST MEDICAL HISTORY:  has a past medical history of A fib; Anemia due to blood loss, acute (9/5/2012); DJD (degenerative joint disease); and HTN.  PAST SURGICAL HISTORY:  has a past surgical history that includes surgical history of - and hysterectomy, cervix status unknown.  FAMILY HISTORY: family history is not on file.  SOCIAL  HISTORY:  reports that she has never smoked. She has never used smokeless tobacco. She reports that she does not drink alcohol or use illicit drugs.    Post Discharge Medication Reconciliation Status: discharge medications reconciled and changed, per note/orders (see AVS).  Current Outpatient Prescriptions   Medication Sig Dispense Refill     albuterol (2.5 MG/3ML) 0.083% neb solution Take 1 vial by nebulization every 6 hours as needed for shortness of breath / dyspnea or wheezing       ipratropium - albuterol 0.5 mg/2.5 mg/3 mL (DUONEB) 0.5-2.5 (3) MG/3ML neb solution Take 1 vial by nebulization 2 times daily And every 4hrs PRN       DULoxetine HCl (CYMBALTA PO) Take 30 mg by mouth daily       sennosides (SENOKOT) 8.6 MG tablet Take 1 tablet by mouth daily       Magnesium Hydroxide (MILK OF MAGNESIA PO) Take 30 mLs by mouth as needed.       Calcium-Vitamin D (CALTRATE 600 PLUS-VIT D OR) Take 1 tablet by mouth 2 times daily.       alum & mag hydroxide-simethicone (MYLANTA/MAALOX) 200-200-20 MG/5ML SUSP Take 30 mLs by mouth every 2 hours as needed        acetaminophen (TYLENOL EX ST ARTHRITIS PAIN) 500 MG tablet Take 650 mg by mouth 3 times daily And 650 mg every 4 hours prn         ROS:  10 point ROS of systems including Constitutional, Eyes, Respiratory, Cardiovascular, Gastroenterology, Genitourinary, Integumentary, Muscularskeletal, Psychiatric were all negative except for pertinent positives noted in my HPI.    Exam:  /72  Pulse 83  Temp 97.6  F (36.4  C)  Resp 18  Wt 129 lb 9.6 oz (58.8 kg)  SpO2 93%  BMI 23.9 kg/m2  GENERAL APPEARANCE:  Alert, cooperative  ENT:  Mouth and posterior oropharynx normal, moist mucous membranes  EYES:  EOM, conjunctivae, lids, pupils and irises normal  NECK:  No adenopathy,masses or thyromegaly  RESP:  respiratory effort and palpation of chest normal, diminished BS at the bases, no wheezing no respiratory distress  CV:  Palpation and auscultation of heart done ,  Irregular rate and rhythm, no murmur, rub, or gallop, no edema  ABDOMEN:  normal bowel sounds, soft, nontender, no hepatosplenomegaly or other masses  M/S:   Gait and station abnormal uses WC, self proper.   SKIN:  tight and dry skin over legs. Mole 1x1 cm black over right cheek.   NEURO:   Cranial nerves 2-12 are normal tested and grossly at patient's baseline, no purposeful movement in upper and lower extremities  PSYCH:  oriented to person, place and year/month , not day. , affect and mood normal    Lab/Diagnostic data:   CBC RESULTS:   Recent Labs   Lab Test  10/12/17   0600  10/24/12   0600 09/12/12   0600   WBC   --    --    --   5.3   RBC   --    --    --   3.16*   HGB  12.8  12.8   < >  9.8*   HCT   --    --    --   32.3*   MCV   --    --    --   102*   MCH   --    --    --   31.0   MCHC   --    --    --   30.3*   RDW   --    --    --   18.4*   PLT   --    --    --   301    < > = values in this interval not displayed.       Last Basic Metabolic Panel:  Recent Labs   Lab Test  10/12/17   0600 09/12/12   0600   NA  138  139   POTASSIUM  4.3  4.3   CHLORIDE  103  103   OG  8.8  8.0*   CO2  30  30   BUN  12  15   CR  0.48*  0.50*   GLC  84  85       Liver Function Studies -   Recent Labs   Lab Test  03/22/12   0920  12/26/11   1135   PROTTOTAL  7.0  7.6   ALBUMIN  3.6  4.0   BILITOTAL  0.4  0.5   ALKPHOS  78  76   AST  27  29   ALT  9  12         ASSESSMENT/PLAN:  Fall from wheelchair, subsequent encounter  Closed nondisplaced fracture of left clavicle with routine healing, unspecified part of clavicle, subsequent encounter  Compression fracture of twelfth thoracic vertebra with routine healing   - Has Osteoporosis, on Denosumab q 6 mo  - Vit D level 33.7 ng/ml (9/6/17); On Vit D 800 units daily. Will add another 600 IU    - Physical function improving with OT/PT, continue.  - Analgesia optimal  - Follow on the surgeon's recommendations      HTN (hypertension), benign  BP Readings from Last 3 Encounters:    10/16/17 132/72   10/10/17 114/73   10/05/17 142/74   - Diet controlled.     Blood in feces  - HH stable, likley hemorrhoids. Continue to monitor.     Possible As silva Exacerbation while hospitalized:  - completed prednisone taper  - On scheduled Duoneb and prn albuterol and Duoneb.   - Not known if has COPD.   -  Will dc Duoneb prn and scheduled, start albuterol neb bid scheduled for 5 days, continue prn  - Will add beclomethasone inh 40 mcg bid, one puff, will re-evaluate in one week.     Constipation:  - resolved, now has more frequent BM and soft in consistency, will dc MOM> .     Physical deconditioning  - improving, continue.        Orders:  1.  Vit D 600 IU daily  2.  DC Duoneb PRN and scheduled  3.  Albuterol neb Bid x5 days scheduled and every 6hr PRN for wheezing.  4.  Beclomethasone dipropionate inhaler 40 mcq one puff BID.    5.  GNP to follow-up in one week.  6.  DC MOM.  7. See above, otherwise, continue the rest of the current POC.         Electronically signed by:  Leida Johnson MD

## 2017-10-18 ENCOUNTER — NURSING HOME VISIT (OUTPATIENT)
Dept: GERIATRICS | Facility: CLINIC | Age: 82
End: 2017-10-18
Payer: COMMERCIAL

## 2017-10-18 DIAGNOSIS — S22.080D COMPRESSION FRACTURE OF TWELFTH THORACIC VERTEBRA WITH ROUTINE HEALING: ICD-10-CM

## 2017-10-18 DIAGNOSIS — S42.002D CLOSED NONDISPLACED FRACTURE OF LEFT CLAVICLE WITH ROUTINE HEALING, UNSPECIFIED PART OF CLAVICLE, SUBSEQUENT ENCOUNTER: ICD-10-CM

## 2017-10-18 DIAGNOSIS — R53.81 PHYSICAL DECONDITIONING: ICD-10-CM

## 2017-10-18 DIAGNOSIS — K92.1 BLOOD IN FECES: ICD-10-CM

## 2017-10-18 DIAGNOSIS — I10 HTN (HYPERTENSION), BENIGN: ICD-10-CM

## 2017-10-18 DIAGNOSIS — J45.901 MODERATE ASTHMA WITH EXACERBATION, UNSPECIFIED WHETHER PERSISTENT: ICD-10-CM
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
## 2017-10-18 PROCEDURE — 99306 1ST NF CARE HIGH MDM 50: CPT | Performed by: FAMILY MEDICINE

## 2017-10-18 PROCEDURE — 99207 ZZC CDG-CORRECTLY CODED, REVIEWED AND AGREE: CPT | Performed by: FAMILY MEDICINE

## 2017-10-18 RX ORDER — ALBUTEROL SULFATE 0.83 MG/ML
1 SOLUTION RESPIRATORY (INHALATION) EVERY 6 HOURS PRN
COMMUNITY

## 2017-10-24 ENCOUNTER — NURSING HOME VISIT (OUTPATIENT)
Dept: FAMILY MEDICINE | Facility: CLINIC | Age: 82
End: 2017-10-24
Payer: COMMERCIAL

## 2017-10-24 VITALS
BODY MASS INDEX: 21.89 KG/M2 | SYSTOLIC BLOOD PRESSURE: 136 MMHG | OXYGEN SATURATION: 95 % | WEIGHT: 118.7 LBS | DIASTOLIC BLOOD PRESSURE: 72 MMHG | RESPIRATION RATE: 19 BRPM | HEART RATE: 80 BPM | TEMPERATURE: 98.4 F

## 2017-10-24 DIAGNOSIS — I87.2 STASIS DERMATITIS OF BOTH LEGS: Primary | ICD-10-CM

## 2017-10-24 PROCEDURE — 99308 SBSQ NF CARE LOW MDM 20: CPT | Performed by: NURSE PRACTITIONER

## 2017-10-24 NOTE — PROGRESS NOTES
[facility]    Chief Complaint   Patient presents with     Nursing Home Acute       HPI:    Benson Kapoor is a 89 year old  (5/20/1925), who is being seen today for an episodic care visit at [facility].    HPI information obtained from: facility chart records and facility staff. Today's concern is:    Asked by nursing to see patient due to blister on right leg  Patient tells that this comes and goes   States her legs are not painful     REVIEW OF SYSTEMS:  4 point ROS including Respiratory, CV, GI and , other than that noted in the HPI,  is negative    /72  Pulse 80  Temp 98.4  F (36.9  C)  Resp 19  Wt 118 lb 11.2 oz (53.8 kg)  SpO2 95%  BMI 21.89 kg/m22  GENERAL APPEARANCE:  Alert, in no distress  RESP:  respiratory effort and palpation of chest normal, auscultation of lungs clear , no respiratory distress  CV:  Palpation and auscultation of heart done , rate and rhythm WNL, no murmur, trace peripheral edema  ABDOMEN:  normal bowel sounds, soft, nontender, no hepatosplenomegaly or other masses  SKIN:  Inspection and Palpation of skin and subcutaneous tissue dry and flaky skin on lower legs- no blistering or erythema noted        ASSESSMENT/PLAN:  Stasis dermatitis of both legs         Orders:  1.  Triamcinolone cream 0.5% daily to bilateral leg for 2 wks and then PRN.  Dx:  Stasis dermatitis      Total time spent with patient visit at the skilled nursing facility was 15 min including patient visit. Greater than 50% of total time spent with counseling and coordinating care due to impaired memory.    Rajni Shah NP

## 2017-10-26 RX ORDER — TRIAMCINOLONE ACETONIDE 5 MG/G
CREAM TOPICAL DAILY
COMMUNITY
Start: 2017-10-26 | End: 2017-11-09

## 2017-11-02 ENCOUNTER — NURSING HOME VISIT (OUTPATIENT)
Dept: FAMILY MEDICINE | Facility: CLINIC | Age: 82
End: 2017-11-02
Payer: COMMERCIAL

## 2017-11-02 VITALS
TEMPERATURE: 98 F | WEIGHT: 129.2 LBS | SYSTOLIC BLOOD PRESSURE: 138 MMHG | HEART RATE: 88 BPM | DIASTOLIC BLOOD PRESSURE: 80 MMHG | OXYGEN SATURATION: 94 % | BODY MASS INDEX: 23.82 KG/M2 | RESPIRATION RATE: 18 BRPM

## 2017-11-02 DIAGNOSIS — S22.000A CLOSED COMPRESSION FRACTURE OF THORACIC VERTEBRA, INITIAL ENCOUNTER (H): ICD-10-CM

## 2017-11-02 DIAGNOSIS — I10 HTN (HYPERTENSION), BENIGN: ICD-10-CM

## 2017-11-02 DIAGNOSIS — S42.002D CLOSED NONDISPLACED FRACTURE OF LEFT CLAVICLE WITH ROUTINE HEALING, UNSPECIFIED PART OF CLAVICLE, SUBSEQUENT ENCOUNTER: ICD-10-CM

## 2017-11-02 DIAGNOSIS — J41.8 MIXED SIMPLE AND MUCOPURULENT CHRONIC BRONCHITIS (H): Primary | ICD-10-CM

## 2017-11-02 PROCEDURE — 99309 SBSQ NF CARE MODERATE MDM 30: CPT | Performed by: NURSE PRACTITIONER

## 2017-11-02 NOTE — PROGRESS NOTES
[facility]    Chief Complaint   Patient presents with     senior living Regulatory       HPI:    Benson Kapoor is a 89 year old  (5/20/1925), who is being seen today for a federally mandated E/M visit at [facility] .  HPI information obtained from: facility chart records, facility staff and patient report. Today's concerns are:  Mixed simple and mucopurulent chronic bronchitis (H)  Stable with d/c of duonebs, started on qvar and albuterol as needed     Closed nondisplaced fracture of left clavicle with routine healing, unspecified part of clavicle, subsequent encounter    Closed compression fracture of thoracic vertebra, initial encounter (H)  - Has Osteoporosis, on Denosumab q 6 mo  - Vit D level 33.7 ng/ml (9/6/17); On Vit D 800 units daily and another 600 IU added 2 weeks ago    - Physical function improving with OT/PT      ALLERGIES: Review of patient's allergies indicates no known allergies.  PAST MEDICAL HISTORY:  has a past medical history of A fib; Anemia due to blood loss, acute (9/5/2012); DJD (degenerative joint disease); and HTN.  PAST SURGICAL HISTORY:  has a past surgical history that includes surgical history of - and hysterectomy, cervix status unknown.  FAMILY HISTORY: family history is not on file.  SOCIAL HISTORY:  reports that she has never smoked. She has never used smokeless tobacco. She reports that she does not drink alcohol or use illicit drugs.    MEDICATIONS:  Current Outpatient Prescriptions   Medication Sig Dispense Refill     triamcinolone (KENALOG) 0.5 % cream Apply topically daily       VITAMIN D, CHOLECALCIFEROL, PO Take 600 Units by mouth daily       albuterol (2.5 MG/3ML) 0.083% neb solution Take 1 vial by nebulization 2 times daily And every 6hrs PRN       beclomethasone (QVAR) 40 MCG/ACT Inhaler Inhale 1 puff into the lungs 2 times daily       albuterol (2.5 MG/3ML) 0.083% neb solution Take 1 vial by nebulization every 6 hours as needed for  shortness of breath / dyspnea or wheezing       DULoxetine HCl (CYMBALTA PO) Take 30 mg by mouth daily       sennosides (SENOKOT) 8.6 MG tablet Take 1 tablet by mouth daily       Magnesium Hydroxide (MILK OF MAGNESIA PO) Take 30 mLs by mouth as needed.       Calcium-Vitamin D (CALTRATE 600 PLUS-VIT D OR) Take 1 tablet by mouth 2 times daily.       acetaminophen (TYLENOL EX ST ARTHRITIS PAIN) 500 MG tablet Take 650 mg by mouth 3 times daily And 650 mg every 4 hours prn       Medications reviewed:  Medications reconciled to facility chart and changes were made to reflect current medications as identified as above med list. Below are the changes that were made:   Medications stopped since last EPIC medication reconciliation:   There are no discontinued medications.    Medications started since last [facility] medication reconciliation:  No orders of the defined types were placed in this encounter.         Case Management:  I have reviewed the care plan and MDS and do agree with the plan. Patient's desire to return to the community is present, but is not able due to care needs .  Information reviewed:  Medications, vital signs, orders, and nursing notes.    ROS:  4 point ROS including Respiratory, CV, GI and , other than that noted in the HPI,  is negative    Exam:  Vitals: /80  Pulse 88  Temp 98  F (36.7  C)  Resp 18  Wt 129 lb 3.2 oz (58.6 kg)  SpO2 94%  BMI 23.82 kg/m2  BMI= Body mass index is 23.82 kg/(m^2).  GENERAL APPEARANCE:  Alert, in no distress  EYES:  EOM, conjunctivae, lids, pupils and irises normal  RESP:  respiratory effort and palpation of chest normal, lungs clear to auscultation , no respiratory distress  CV:  Palpation and auscultation of heart done , regular rate and rhythm, no murmur, rub, or gallop  ABDOMEN:  normal bowel sounds, soft, nontender, no hepatosplenomegaly or other masses  M/S:   Gait and station abnormal wheelchairbound  Digits and nails normal  SKIN:  Inspection of skin and  subcutaneous tissue baseline, Palpation of skin and subcutaneous tissue baseline  PSYCH:  normal insight, judgement and memory, affect and mood normal    Lab/Diagnostic data:    Hospital Laboratory on 10/12/2017   Component Date Value Ref Range Status     Sodium 10/12/2017 138  133 - 144 mmol/L Final     Potassium 10/12/2017 4.3  3.4 - 5.3 mmol/L Final     Chloride 10/12/2017 103  94 - 109 mmol/L Final     Carbon Dioxide 10/12/2017 30  20 - 32 mmol/L Final     Anion Gap 10/12/2017 5  3 - 14 mmol/L Final     Glucose 10/12/2017 84  70 - 99 mg/dL Final     Urea Nitrogen 10/12/2017 12  7 - 30 mg/dL Final     Creatinine 10/12/2017 0.48* 0.52 - 1.04 mg/dL Final     GFR Estimate 10/12/2017 >90  >60 mL/min/1.7m2 Final    Non  GFR Calc     GFR Estimate If Black 10/12/2017 >90  >60 mL/min/1.7m2 Final    African American GFR Calc     Calcium 10/12/2017 8.8  8.5 - 10.1 mg/dL Final     Hemoglobin 10/12/2017 12.8  11.7 - 15.7 g/dL Final         ASSESSMENT/PLAN     Mixed simple and mucopurulent chronic bronchitis (H)  HTN (hypertension), benign  Closed nondisplaced fracture of left clavicle with routine healing, unspecified part of clavicle, subsequent encounter  Closed compression fracture of thoracic vertebra, initial encounter (H)    Orders:  1.  Okay to resume both sources of Vit D.  2.  No change in COPD treatment regimen.  No wheezing noted.      Total time spent with patient visit at the skilled nursing facility was 25 min including patient visit and review of past records. Greater than 50% of total time spent with counseling and coordinating care.    Electronically signed by:  Rajni Shah NP

## 2017-11-02 NOTE — MR AVS SNAPSHOT
"              After Visit Summary   11/2/2017    Benson Kapoor    MRN: [id number]           Patient Information     Date Of Birth          5/20/1925        Visit Information        Provider Department      11/2/2017 11:00 AM Rajni Shah NP [facility]        Today's Diagnoses     Mixed simple and mucopurulent chronic bronchitis (H)    -  1    HTN (hypertension), benign        Closed nondisplaced fracture of left clavicle with routine healing, unspecified part of clavicle, subsequent encounter        Closed compression fracture of thoracic vertebra, initial encounter (H)           Follow-ups after your visit        Who to contact     If you have questions or need follow up information about today's clinic visit or your schedule please contact [facility] directly at [phone].  Normal or non-critical lab and imaging results will be communicated to you by EventSneakerhart, letter or phone within 4 business days after the clinic has received the results. If you do not hear from us within 7 days, please contact the clinic through MyChart or phone. If you have a critical or abnormal lab result, we will notify you by phone as soon as possible.  Submit refill requests through CoupOption or call your pharmacy and they will forward the refill request to us. Please allow 3 business days for your refill to be completed.          Additional Information About Your Visit        EventSneakerharKeraderm Information     CoupOption lets you send messages to your doctor, view your test results, renew your prescriptions, schedule appointments and more. To sign up, go to [email or web address] . Click on \"Log in\" on the left side of the screen, which will take you to the Welcome page. Then click on \"Sign up Now\" on the right side of the page.     You will be asked to enter the access code listed below, as well as some personal information. Please follow the directions to create your username and password.     Your access code " is: [email or web address]  Expires: 1/10/2018  2:37 PM     Your access code will  in 90 days. If you need help or a new code, please call your [facility] or [phone].        Care EveryWhere ID     This is your Care EveryWhere ID. This could be used by other organizations to access your [place] medical records  [id number]        Your Vitals Were     Pulse Temperature Respirations Pulse Oximetry BMI (Body Mass Index)       88 98  F (36.7  C) 18 94% 23.82 kg/m2        Blood Pressure from Last 3 Encounters:   17 138/80   10/24/17 136/72   10/16/17 132/72    Weight from Last 3 Encounters:   17 129 lb 3.2 oz (58.6 kg)   10/24/17 118 lb 11.2 oz (53.8 kg)   10/16/17 129 lb 9.6 oz (58.8 kg)              Today, you had the following     No orders found for display       Primary Care Provider Office Phone # Fax #    Tania Stephy Grullon [phone]       [facility] [address] [facility] [address]        Equal Access to Services     [facility]: Hadii cristóbal Diaz, waaxda norma, qaybta kaalmada lisset, bernice knight. So [facility] [phone].    ATENCIÓN: Si habla español, tiene a isaac disposición servicios gratuitos de asistencia lingüística. Llame al [phone].    We comply with applicable federal civil rights laws and [place] laws. We do not discriminate on the basis of race, color, national origin, age, disability, sex, sexual orientation, or gender identity.            Thank you!     Thank you for choosing [facility]  for your care. Our goal is always to provide you with excellent care. Hearing back from our patients is one way we can continue to improve our services. Please take a few minutes to complete the written survey that you may receive in the mail after your visit with us. Thank you!             Your Updated Medication List - Protect others around you: Learn how to safely use, store and throw away  your medicines at www.disposemymeds.org.          This list is accurate as of: 11/2/17 11:59 PM.  Always use your most recent med list.                   Brand Name Dispense Instructions for use Diagnosis    * albuterol (2.5 MG/3ML) 0.083% neb solution      Take 1 vial by nebulization every 6 hours as needed for shortness of breath / dyspnea or wheezing        * albuterol (2.5 MG/3ML) 0.083% neb solution      Take 1 vial by nebulization 2 times daily And every 6hrs PRN        beclomethasone 40 MCG/ACT Inhaler    QVAR     Inhale 1 puff into the lungs 2 times daily        CALTRATE 600 PLUS-VIT D OR      Take 1 tablet by mouth 2 times daily.        CYMBALTA PO      Take 30 mg by mouth daily        MILK OF MAGNESIA PO      Take 30 mLs by mouth as needed.        sennosides 8.6 MG tablet    SENOKOT     Take 1 tablet by mouth daily        triamcinolone 0.5 % cream    KENALOG     Apply topically daily        TYLENOL EX ST ARTHRITIS PAIN 500 MG tablet   Generic drug:  acetaminophen      Take 650 mg by mouth 3 times daily And 650 mg every 4 hours prn        VITAMIN D (CHOLECALCIFEROL) PO      Take 600 Units by mouth daily        * Notice:  This list has 2 medication(s) that are the same as other medications prescribed for you. Read the directions carefully, and ask your doctor or other care provider to review them with you.

## 2017-11-09 ENCOUNTER — NURSING HOME VISIT (OUTPATIENT)
Dept: FAMILY MEDICINE | Facility: CLINIC | Age: 82
End: 2017-11-09
Payer: COMMERCIAL

## 2017-11-09 VITALS
OXYGEN SATURATION: 94 % | WEIGHT: 132.6 LBS | RESPIRATION RATE: 18 BRPM | SYSTOLIC BLOOD PRESSURE: 140 MMHG | HEART RATE: 87 BPM | DIASTOLIC BLOOD PRESSURE: 76 MMHG | BODY MASS INDEX: 24.45 KG/M2 | TEMPERATURE: 98.5 F

## 2017-11-09 DIAGNOSIS — R60.0 PERIPHERAL EDEMA: ICD-10-CM

## 2017-11-09 DIAGNOSIS — R09.02 HYPOXIA: Primary | ICD-10-CM

## 2017-11-09 DIAGNOSIS — R63.5 ABNORMAL WEIGHT GAIN: ICD-10-CM

## 2017-11-09 PROCEDURE — 99309 SBSQ NF CARE MODERATE MDM 30: CPT | Performed by: NURSE PRACTITIONER

## 2017-11-09 NOTE — PROGRESS NOTES
[facility]    Chief Complaint   Patient presents with     Nursing Home Acute       HPI:    Benson Kapoor is a 89 year old  (5/20/1925), who is being seen today for an episodic care visit at [facility].    HPI information obtained from: facility chart records, facility staff and patient report. Today's concern is:     Hypoxia  Abnormal weight gain  Peripheral edema     Was asked by nursing to see patient due to concern in weight gain   Wt Readings from Last 4 Encounters:   11/09/17 132 lb 9.6 oz (60.1 kg)   11/02/17 129 lb 3.2 oz (58.6 kg)   10/24/17 118 lb 11.2 oz (53.8 kg)   10/16/17 129 lb 9.6 oz (58.8 kg)     Her oxygen level was also 88% this AM       REVIEW OF SYSTEMS:  4 point ROS including Respiratory, CV, GI and , other than that noted in the HPI,  is negative    /76  Pulse 87  Temp 98.5  F (36.9  C)  Resp 18  Wt 132 lb 9.6 oz (60.1 kg)  SpO2 94%  BMI 24.45 kg/m2  GENERAL APPEARANCE:  Alert, in no distress  RESP:  respiratory effort and palpation of chest normal, auscultation of lungs crackles in bilateral bases , no respiratory distress  CV:  Palpation and auscultation of heart done , rate and rhythm normal, no murmur, 1-2+ peripheral edema  ABDOMEN:  normal bowel sounds, soft, nontender, no hepatosplenomegaly or other masses  M/S:   Gait and station wheelchair bound, Digits and nails intact  SKIN:  Inspection and Palpation of skin and subcutaneous tissue intact  NEURO: 2-12 in normal limits and at patient's baseline  PSYCH:  insight and judgement, memory impaired , affect and mood normal      ASSESSMENT/PLAN:     Hypoxia  Abnormal weight gain  Peripheral edema     Orders:  1.  Lasix 40mg daily for 3 days and KCL 20meq daily for 3 days for weight gain and edema.      Total time spent with patient visit at the skilled nursing facility was 25 min including patient visit and face to face visit with sons. Greater than 50% of total time spent with counseling and  coordinating care due to impaired memory.    Rajni Shah NP

## 2017-11-12 PROBLEM — G30.1 LATE ONSET ALZHEIMER'S DISEASE WITHOUT BEHAVIORAL DISTURBANCE (H): Status: ACTIVE | Noted: 2017-11-12

## 2017-11-12 PROBLEM — F02.80 LATE ONSET ALZHEIMER'S DISEASE WITHOUT BEHAVIORAL DISTURBANCE (H): Status: ACTIVE | Noted: 2017-11-12

## 2017-11-13 ENCOUNTER — NURSING HOME VISIT (OUTPATIENT)
Dept: FAMILY MEDICINE | Facility: CLINIC | Age: 82
End: 2017-11-13
Payer: COMMERCIAL

## 2017-11-13 VITALS
OXYGEN SATURATION: 97 % | HEART RATE: 87 BPM | WEIGHT: 126.8 LBS | TEMPERATURE: 98.5 F | RESPIRATION RATE: 18 BRPM | DIASTOLIC BLOOD PRESSURE: 76 MMHG | BODY MASS INDEX: 23.38 KG/M2 | SYSTOLIC BLOOD PRESSURE: 140 MMHG

## 2017-11-13 DIAGNOSIS — R63.5 ABNORMAL WEIGHT GAIN: ICD-10-CM

## 2017-11-13 DIAGNOSIS — R09.02 HYPOXIA: Primary | ICD-10-CM

## 2017-11-13 DIAGNOSIS — R60.0 PERIPHERAL EDEMA: ICD-10-CM

## 2017-11-13 PROCEDURE — 99308 SBSQ NF CARE LOW MDM 20: CPT | Performed by: NURSE PRACTITIONER

## 2017-11-13 NOTE — MR AVS SNAPSHOT
"              After Visit Summary   2017    Benson Kapoor    MRN: [id number]           Patient Information     Date Of Birth          1925        Visit Information        Provider Department      2017 3:20 PM Rajni Shah NP [facility]        Today's Diagnoses     Hypoxia    -  1    Abnormal weight gain        Peripheral edema           Follow-ups after your visit        Who to contact     If you have questions or need follow up information about today's clinic visit or your schedule please contact [facility] directly at [phone].  Normal or non-critical lab and imaging results will be communicated to you by Pano Logichart, letter or phone within 4 business days after the clinic has received the results. If you do not hear from us within 7 days, please contact the clinic through Pano Logichart or phone. If you have a critical or abnormal lab result, we will notify you by phone as soon as possible.  Submit refill requests through BlueSpace or call your pharmacy and they will forward the refill request to us. Please allow 3 business days for your refill to be completed.          Additional Information About Your Visit        MyChart Information     BlueSpace lets you send messages to your doctor, view your test results, renew your prescriptions, schedule appointments and more. To sign up, go to [email or web address] . Click on \"Log in\" on the left side of the screen, which will take you to the Welcome page. Then click on \"Sign up Now\" on the right side of the page.     You will be asked to enter the access code listed below, as well as some personal information. Please follow the directions to create your username and password.     Your access code is: [email or web address]  Expires: 1/10/2018  1:37 PM     Your access code will  in 90 days. If you need help or a new code, please call your [facility] or [phone].        Care EveryWhere ID     This is your Care EveryWhere " ID. This could be used by other organizations to access your [place] medical records  [id number]        Your Vitals Were     Pulse Temperature Respirations Pulse Oximetry BMI (Body Mass Index)       87 98.5  F (36.9  C) 18 97% 23.38 kg/m2        Blood Pressure from Last 3 Encounters:   11/13/17 140/76   11/09/17 140/76   11/02/17 138/80    Weight from Last 3 Encounters:   11/13/17 126 lb 12.8 oz (57.5 kg)   11/09/17 132 lb 9.6 oz (60.1 kg)   11/02/17 129 lb 3.2 oz (58.6 kg)              Today, you had the following     No orders found for display       Primary Care Provider Office Phone # Fax #    Tania Stephy Grullon [phone]       [facility]  [address] [facility] [address]        Equal Access to Services     [facility]: Hadii cristóbal rosarioo Sogreg, waaxda luqronel, qaybta kaalmada adejennifer, bernice leone . So [facility] [phone].    ATENCIÓN: Si habla español, tiene a isaac disposición servicios gratuitos de asistencia lingüística. Florencio al [phone].    We comply with applicable federal civil rights laws and [place] laws. We do not discriminate on the basis of race, color, national origin, age, disability, sex, sexual orientation, or gender identity.            Thank you!     Thank you for choosing [facility]  for your care. Our goal is always to provide you with excellent care. Hearing back from our patients is one way we can continue to improve our services. Please take a few minutes to complete the written survey that you may receive in the mail after your visit with us. Thank you!             Your Updated Medication List - Protect others around you: Learn how to safely use, store and throw away your medicines at www.disposemymeds.org.          This list is accurate as of: 11/13/17 11:59 PM.  Always use your most recent med list.                   Brand Name Dispense Instructions for use Diagnosis    * albuterol (2.5  MG/3ML) 0.083% neb solution      Take 1 vial by nebulization every 6 hours as needed for shortness of breath / dyspnea or wheezing        * albuterol (2.5 MG/3ML) 0.083% neb solution      Take 1 vial by nebulization 2 times daily And every 6hrs PRN        beclomethasone 40 MCG/ACT Inhaler    QVAR     Inhale 1 puff into the lungs 2 times daily        CALTRATE 600 PLUS-VIT D OR      Take 1 tablet by mouth 2 times daily.        CYMBALTA PO      Take 30 mg by mouth daily        MILK OF MAGNESIA PO      Take 30 mLs by mouth as needed.        sennosides 8.6 MG tablet    SENOKOT     Take 1 tablet by mouth daily        TYLENOL EX ST ARTHRITIS PAIN 500 MG tablet   Generic drug:  acetaminophen      Take 650 mg by mouth 3 times daily And 650 mg every 4 hours prn        VITAMIN D (CHOLECALCIFEROL) PO      Take 600 Units by mouth daily        * Notice:  This list has 2 medication(s) that are the same as other medications prescribed for you. Read the directions carefully, and ask your doctor or other care provider to review them with you.

## 2017-11-13 NOTE — PROGRESS NOTES
[facility]    Chief Complaint   Patient presents with     Nursing Home Acute       HPI:    Benson Kapoor is a 89 year old  (5/20/1925), who is being seen today for an episodic care visit at [facility].    HPI information obtained from: facility chart records, facility staff and patient report. Today's concern is:  Hypoxia  Still using oxygen 2 liters at 97%   Patient denies any breathing difficulty today   Was given 40 mg of lasix and potassium for 3 days on 11/9  Responded well to this     Abnormal weight gain  Wt Readings from Last 4 Encounters:   11/13/17 126 lb 12.8 oz (57.5 kg)   11/09/17 132 lb 9.6 oz (60.1 kg)   11/02/17 129 lb 3.2 oz (58.6 kg)   10/24/17 118 lb 11.2 oz (53.8 kg)     Weight improved    Peripheral edema  Resolved with the lasix       REVIEW OF SYSTEMS:  4 point ROS including Respiratory, CV, GI and , other than that noted in the HPI,  is negative    /76  Pulse 87  Temp 98.5  F (36.9  C)  Resp 18  Wt 126 lb 12.8 oz (57.5 kg)  SpO2 97%  BMI 23.38 kg/m2  GENERAL APPEARANCE:  Alert, in no distress  RESP:  respiratory effort and palpation of chest normal, auscultation of lungs clear , no respiratory distress  CV:  Palpation and auscultation of heart done , rate and rhythm WNL, no murmur, no peripheral edema  ABDOMEN:  normal bowel sounds, soft, nontender, no hepatosplenomegaly or other masses      ASSESSMENT/PLAN:     Hypoxia  Abnormal weight gain  Peripheral edema     Resolved with lasix   Will continue to monitor  Wean oxygen       Total time spent with patient visit at the skilled nursing facility was 15 min including patient visit. Greater than 50% of total time spent with counseling and coordinating care due to memory impairment.    Rajni Shah NP

## 2017-11-21 ENCOUNTER — NURSING HOME VISIT (OUTPATIENT)
Dept: FAMILY MEDICINE | Facility: CLINIC | Age: 82
End: 2017-11-21
Payer: COMMERCIAL

## 2017-11-21 ENCOUNTER — HOSPITAL LABORATORY (OUTPATIENT)
Facility: OTHER | Age: 82
End: 2017-11-21

## 2017-11-21 VITALS
SYSTOLIC BLOOD PRESSURE: 169 MMHG | OXYGEN SATURATION: 97 % | DIASTOLIC BLOOD PRESSURE: 87 MMHG | HEART RATE: 88 BPM | RESPIRATION RATE: 18 BRPM | BODY MASS INDEX: 23.51 KG/M2 | WEIGHT: 127.5 LBS | TEMPERATURE: 98.5 F

## 2017-11-21 DIAGNOSIS — G30.1 LATE ONSET ALZHEIMER'S DISEASE WITHOUT BEHAVIORAL DISTURBANCE (H): ICD-10-CM

## 2017-11-21 DIAGNOSIS — F02.80 LATE ONSET ALZHEIMER'S DISEASE WITHOUT BEHAVIORAL DISTURBANCE (H): ICD-10-CM

## 2017-11-21 DIAGNOSIS — J41.8 MIXED SIMPLE AND MUCOPURULENT CHRONIC BRONCHITIS (H): Primary | ICD-10-CM

## 2017-11-21 DIAGNOSIS — R41.89: ICD-10-CM

## 2017-11-21 PROCEDURE — 99310 SBSQ NF CARE HIGH MDM 45: CPT | Performed by: NURSE PRACTITIONER

## 2017-11-21 RX ORDER — IPRATROPIUM BROMIDE AND ALBUTEROL SULFATE 2.5; .5 MG/3ML; MG/3ML
1 SOLUTION RESPIRATORY (INHALATION) 4 TIMES DAILY
COMMUNITY
End: 2018-04-18

## 2017-11-21 NOTE — PROGRESS NOTES
"[facility] [facility]    Chief Complaint   Patient presents with     Nursing Home Acute       HPI:    Benson Kapoor is a 89 year old  (5/20/1925), who is being seen today for an episodic care visit at [facility].    HPI information obtained from: facility chart records, facility staff, patient report and family/first contact Taras report. Today's concern is:    Was asked by nursing to see patient   This AM when nursing noted patient to be lethargic, minimally responsive and weak    Upon entering room patient is answering some yes or no questions but is lethargic   She denies pain, shortness of breath, dysuria 2   Reports \"I just don't feel well\"  She is weak and unable to hold arms or legs up   Staff report that she was in her normal start yesterday  Ate well   No concern     REVIEW OF SYSTEMS:  Unobtainable secondary to cognitive impairment or aphasia, but today pt reports \"I don't feel well\"    /87  Pulse 88  Temp 98.5  F (36.9  C)  Resp 18  Wt 127 lb 8 oz (57.8 kg)  SpO2 97%  BMI 23.51 kg/m2  GENERAL APPEARANCE:  Alert, in no distress  RESP:  respiratory effort and palpation of chest normal, auscultation of lungs diminished t/ no rails or wheeze , no respiratory distress  CV:  Palpation and auscultation of heart done , rate and rhythm WNL, no murmur, no peripheral edema  ABDOMEN:  normal bowel sounds, soft, nontender, no hepatosplenomegaly or other masses  SKIN:  Inspection and Palpation of skin and subcutaneous tissue dry   NEURO: 2-12 in normal limits and at patient's baseline  Equal hand grasps but weak, no facial droop, speech is soft but clear   PSYCH:  insight and judgement, memory impaired , affect and mood normal      ASSESSMENT/PLAN:  Level of consciousness decreased  Unclear etiology   Will check for infection with UA/UC and CXR   ?dehydration- was treated with lasix lasix week for increased weight and edema- will check a BMP- I attempted to draw labs today " unsucessfully. Lab will be at facility tomorrow.   Patient request to be left alone, discussed my concerns and she declines wanting to go to EMERGENCY ROOM for further work up  POLST reflects this   I spoke with son Taras who agrees that he does not want his mom sent to the EMERGENCY ROOM or hospitalized request work up be done at facility   If she worsens will need to have further discussion but after talking with him sounds like they would prefer comfort approach       Mixed simple and mucopurulent chronic bronchitis (H)  Will schedule DUP nebs   Will recheck on Friday   Await CXR results       Late onset Alzheimer's disease without behavioral disturbance  Progressing        Orders:  1.  STAT UA/UC, 2 view CXR, CBC and BMP.  Dx:  Weakness, decrease LOC  2.  Duonebs QID.  Dx:  Wheeze  3.  Please call me with CXR results.      Total time spent with patient visit at the skilled nursing facility was 45 min including patient visit, review of past records and phone call to patient contact. Greater than 50% of total time spent with counseling and coordinating care due to impaired cognition.     Rajni Shah NP

## 2017-11-22 ENCOUNTER — HOSPITAL LABORATORY (OUTPATIENT)
Facility: OTHER | Age: 82
End: 2017-11-22

## 2017-11-22 LAB
ALBUMIN UR-MCNC: 10 MG/DL
ANION GAP SERPL CALCULATED.3IONS-SCNC: 7 MMOL/L (ref 3–14)
APPEARANCE UR: CLEAR
BACTERIA SPEC CULT: NORMAL
BILIRUB UR QL STRIP: NEGATIVE
BUN SERPL-MCNC: 13 MG/DL (ref 7–30)
CALCIUM SERPL-MCNC: 8.6 MG/DL (ref 8.5–10.1)
CHLORIDE SERPL-SCNC: 99 MMOL/L (ref 94–109)
CO2 SERPL-SCNC: 34 MMOL/L (ref 20–32)
COLOR UR AUTO: YELLOW
CREAT SERPL-MCNC: 0.43 MG/DL (ref 0.52–1.04)
ERYTHROCYTE [DISTWIDTH] IN BLOOD BY AUTOMATED COUNT: 14.1 % (ref 10–15)
GFR SERPL CREATININE-BSD FRML MDRD: >90 ML/MIN/1.7M2
GLUCOSE SERPL-MCNC: 85 MG/DL (ref 70–99)
GLUCOSE UR STRIP-MCNC: NEGATIVE MG/DL
HCT VFR BLD AUTO: 39.1 % (ref 35–47)
HGB BLD-MCNC: 12.7 G/DL (ref 11.7–15.7)
HGB UR QL STRIP: ABNORMAL
KETONES UR STRIP-MCNC: NEGATIVE MG/DL
LEUKOCYTE ESTERASE UR QL STRIP: ABNORMAL
MCH RBC QN AUTO: 31.4 PG (ref 26.5–33)
MCHC RBC AUTO-ENTMCNC: 32.5 G/DL (ref 31.5–36.5)
MCV RBC AUTO: 97 FL (ref 78–100)
MUCOUS THREADS #/AREA URNS LPF: PRESENT /LPF
NITRATE UR QL: NEGATIVE
PH UR STRIP: 5.5 PH (ref 5–7)
PLATELET # BLD AUTO: 136 10E9/L (ref 150–450)
POTASSIUM SERPL-SCNC: 4 MMOL/L (ref 3.4–5.3)
RBC # BLD AUTO: 4.05 10E12/L (ref 3.8–5.2)
RBC #/AREA URNS AUTO: 1 /HPF (ref 0–2)
SODIUM SERPL-SCNC: 140 MMOL/L (ref 133–144)
SOURCE: ABNORMAL
SP GR UR STRIP: 1.02 (ref 1–1.03)
SPECIMEN SOURCE: NORMAL
UROBILINOGEN UR STRIP-MCNC: NORMAL MG/DL (ref 0–2)
WBC # BLD AUTO: 4.7 10E9/L (ref 4–11)
WBC #/AREA URNS AUTO: 5 /HPF (ref 0–2)

## 2017-11-24 ENCOUNTER — NURSING HOME VISIT (OUTPATIENT)
Dept: FAMILY MEDICINE | Facility: CLINIC | Age: 82
End: 2017-11-24
Payer: COMMERCIAL

## 2017-11-24 VITALS
SYSTOLIC BLOOD PRESSURE: 136 MMHG | OXYGEN SATURATION: 95 % | TEMPERATURE: 97.7 F | WEIGHT: 127 LBS | BODY MASS INDEX: 20.41 KG/M2 | HEIGHT: 66 IN | DIASTOLIC BLOOD PRESSURE: 71 MMHG | HEART RATE: 92 BPM | RESPIRATION RATE: 22 BRPM

## 2017-11-24 DIAGNOSIS — R41.89: Primary | ICD-10-CM

## 2017-11-24 DIAGNOSIS — F02.80 LATE ONSET ALZHEIMER'S DISEASE WITHOUT BEHAVIORAL DISTURBANCE (H): ICD-10-CM

## 2017-11-24 DIAGNOSIS — G30.1 LATE ONSET ALZHEIMER'S DISEASE WITHOUT BEHAVIORAL DISTURBANCE (H): ICD-10-CM

## 2017-11-24 DIAGNOSIS — J41.8 MIXED SIMPLE AND MUCOPURULENT CHRONIC BRONCHITIS (H): ICD-10-CM

## 2017-11-24 PROCEDURE — 99308 SBSQ NF CARE LOW MDM 20: CPT | Performed by: NURSE PRACTITIONER

## 2017-11-24 NOTE — PROGRESS NOTES
"[facility]    Chief Complaint   Patient presents with     Nursing Home Acute       HPI:    Benson Kapoor is a 89 year old  (5/20/1925), who is being seen today for an episodic care visit at [facility].    HPI information obtained from: facility chart records, facility staff and patient report. Today's concern is:  ,    Following up today after patient episode of decrease LOC on Tuesday s  She is sitting in the day room   Denies any complaints   Alert   Orientation at baseline     UA was negative for infection   CXR consistent with COPD  Labs unremarkable     REVIEW OF SYSTEMS:  4 point ROS including Respiratory, CV, GI and , other than that noted in the HPI,  is negative    /71  Pulse 92  Temp 97.7  F (36.5  C)  Resp 22  Ht 5' 6\" (1.676 m)  Wt 127 lb (57.6 kg)  SpO2 95%  BMI 20.5 kg/m2  GENERAL APPEARANCE:  Alert, in no distress  RESP:  respiratory effort and palpation of chest normal, auscultation of lungs scattered wheeze , no respiratory distress  CV:  Palpation and auscultation of heart done , rate and rhythm WNL, no murmur, no peripheral edema  ABDOMEN:  normal bowel sounds, soft, nontender, no hepatosplenomegaly or other masses  M/S:   Gait and station wheelchair bound, Digits and nails   SKIN:  Inspection and Palpation of skin and subcutaneous tissue intact  NEURO: 2-12 in normal limits and at patient's baseline  PSYCH:  insight and judgement, memory impaired , affect and mood normal    ASSESSMENT/PLAN:  1. Level of consciousness decreased    2. Late onset Alzheimer's disease without behavioral disturbance    3. Mixed simple and mucopurulent chronic bronchitis (H)      No further episodes of decreased LOS   Resolved today   Will continue to monitor   Duo nebs resumed      Total time spent with patient visit at the skilled nursing facility was 15 min including patient visit and phone call to patient contact. Greater than 50% of total time spent with counseling and " coordinating care due to impaired memory.    Rajni Shah NP

## 2017-11-30 ENCOUNTER — HOSPITAL LABORATORY (OUTPATIENT)
Facility: OTHER | Age: 82
End: 2017-11-30

## 2017-12-19 VITALS
RESPIRATION RATE: 18 BRPM | OXYGEN SATURATION: 96 % | BODY MASS INDEX: 21.29 KG/M2 | HEART RATE: 82 BPM | TEMPERATURE: 97.8 F | SYSTOLIC BLOOD PRESSURE: 137 MMHG | DIASTOLIC BLOOD PRESSURE: 75 MMHG | WEIGHT: 131.9 LBS

## 2017-12-19 NOTE — PROGRESS NOTES
"  [place] [facility]    Chief Complaint   Patient presents with     halfway Regulatory       HPI:    Benson Kapoor is a 89 year old  (5/20/1925), who is being seen today for a federally mandated [facility] .  HPI information obtained from: facility staff and patient report.     Today's concerns are:   Resident seen and examined.   - Reports sleep, appetite and BM are fine.   - RN / GNP has no concern today. Denies chest pain, wheezing or numbness/tingling.   - RN reports no fall in the last 30 days.   - RN reports occassionally  Yells out otherwise \"ok\".   - Was at TCU, now in LTC due to continued need for care.     ----------------------------------------------  - - Past Medical, social, family histories, medications, and allergies reviewed and updated  - Medications reviewed: in the chart and EHR.   - Case Management:   I have reviewed the care plan and MDS and do agree with the plan. Patient's desire to return to the community is not present.  Information reviewed:  Medications, vital signs, orders, and nursing notes.    MEDICATIONS:  Current Outpatient Prescriptions   Medication Sig Dispense Refill     ipratropium - albuterol 0.5 mg/2.5 mg/3 mL (DUONEB) 0.5-2.5 (3) MG/3ML neb solution Take 1 vial by nebulization 4 times daily       VITAMIN D, CHOLECALCIFEROL, PO Take 600 Units by mouth daily       albuterol (2.5 MG/3ML) 0.083% neb solution Take 1 vial by nebulization 2 times daily And every 6hrs PRN       beclomethasone (QVAR) 40 MCG/ACT Inhaler Inhale 1 puff into the lungs 2 times daily       DULoxetine HCl (CYMBALTA PO) Take 30 mg by mouth daily       sennosides (SENOKOT) 8.6 MG tablet Take 1 tablet by mouth daily       Magnesium Hydroxide (MILK OF MAGNESIA PO) Take 30 mLs by mouth as needed.       Calcium-Vitamin D (CALTRATE 600 PLUS-VIT D OR) Take 1 tablet by mouth 2 times daily.       acetaminophen (TYLENOL EX ST ARTHRITIS PAIN) 500 MG tablet Take 650 mg by mouth 3 " times daily And 650 mg every 4 hours prn       Medications reviewed:  Medications Discontinued During This Encounter   Medication Reason     albuterol (2.5 MG/3ML) 0.083% neb solution        ROS:  4 point ROS including Respiratory, CV, GI and , other than that noted in the HPI,  is negative    Exam:  Vitals: /75  Pulse 82  Temp 97.8  F (36.6  C)  Resp 18  Wt 131 lb 14.4 oz (59.8 kg)  SpO2 96%  BMI 21.29 kg/m2  BMI= Body mass index is 21.29 kg/(m^2).  GENERAL APPEARANCE:  Alert, cooperative  ENT: oral moist mucous membranes. NC in place  EYES:  EOM, lids, pupils and irises normal  NECK:  No adenopathy,masses or thyromegaly  RESP:  respiratory effort and palpation of chest normal, diminished BS at the bases, no wheezing no respiratory distress  CV:  Palpation and auscultation of heart done , Irregular rate and rhythm, no murmur, rub, or gallop, no edema  ABDOMEN:  normal bowel sounds, soft, nontender, no hepatosplenomegaly or other masses  M/S:   Gait and station abnormal uses WC, self proper. PIP deformity  SKIN:  tight and dry skin over legs and cold.  Mole 1x1 cm black over right cheek.   NEURO:   Cranial nerves 2-12 are normal tested and grossly at patient's baseline, no purposeful movement in upper and lower extremities. Diffuse muscles atrophy  PSYCH:  AAOx3, affect and mood normal    Lab/Diagnostic data:    Hospital Laboratory on 11/22/2017   Component Date Value Ref Range Status     Sodium 11/22/2017 140  133 - 144 mmol/L Final     Potassium 11/22/2017 4.0  3.4 - 5.3 mmol/L Final     Chloride 11/22/2017 99  94 - 109 mmol/L Final     Carbon Dioxide 11/22/2017 34* 20 - 32 mmol/L Final     Anion Gap 11/22/2017 7  3 - 14 mmol/L Final     Glucose 11/22/2017 85  70 - 99 mg/dL Final     Urea Nitrogen 11/22/2017 13  7 - 30 mg/dL Final     Creatinine 11/22/2017 0.43* 0.52 - 1.04 mg/dL Final     GFR Estimate 11/22/2017 >90  >60 mL/min/1.7m2 Final    Non  GFR Calc     GFR Estimate If Black  11/22/2017 >90  >60 mL/min/1.7m2 Final    African American GFR Calc     Calcium 11/22/2017 8.6  8.5 - 10.1 mg/dL Final     WBC 11/22/2017 4.7  4.0 - 11.0 10e9/L Final     RBC Count 11/22/2017 4.05  3.8 - 5.2 10e12/L Final     Hemoglobin 11/22/2017 12.7  11.7 - 15.7 g/dL Final     Hematocrit 11/22/2017 39.1  35.0 - 47.0 % Final     MCV 11/22/2017 97  78 - 100 fl Final     MCH 11/22/2017 31.4  26.5 - 33.0 pg Final     MCHC 11/22/2017 32.5  31.5 - 36.5 g/dL Final     RDW 11/22/2017 14.1  10.0 - 15.0 % Final     Platelet Count 11/22/2017 136* 150 - 450 10e9/L Final     ASSESSMENT/PLAN  Chronic bronchitis, unspecified chronic bronchitis type (H)  Chronic respiratory failure, unspecified whether with hypoxia or hypercapnia (H)  - still requires O2.   - in copd pt keep SpO2% b/w 88-92%, adjust O2 accordingly.   - on beclomethasone inh for possible asthma. Recent CXR showed COPD. Seems stable.   - However, may consider stopping beclomethasone, monitor, if sx gets worse, add LABA, if not better add LAMA as well.    - clinically stable.     HTN (hypertension), benign  BP Readings from Last 3 Encounters:   12/19/17 137/75   11/24/17 136/71   11/21/17 169/87   - diet controlled    Compression fracture of twelfth thoracic vertebra with routine healing  - analgesia optimal  - on Vit D an calcium supplement    Frail elderly  - Significant  Deficits requiring NH placement. Requiring extensive assistance from nursing. Up for meals only o/w spends the day resting in bed      Orders:  - See above, otherwise, continue the rest of the current POC.     Electronically signed by:  Leida Johnson MD

## 2017-12-20 ENCOUNTER — NURSING HOME VISIT (OUTPATIENT)
Dept: GERIATRICS | Facility: CLINIC | Age: 82
End: 2017-12-20
Payer: COMMERCIAL

## 2017-12-20 DIAGNOSIS — R54 FRAIL ELDERLY: ICD-10-CM

## 2017-12-20 DIAGNOSIS — S22.080D COMPRESSION FRACTURE OF TWELFTH THORACIC VERTEBRA WITH ROUTINE HEALING: ICD-10-CM

## 2017-12-20 DIAGNOSIS — J42 CHRONIC BRONCHITIS, UNSPECIFIED CHRONIC BRONCHITIS TYPE (H): Primary | ICD-10-CM

## 2017-12-20 DIAGNOSIS — J96.10 CHRONIC RESPIRATORY FAILURE, UNSPECIFIED WHETHER WITH HYPOXIA OR HYPERCAPNIA (H): ICD-10-CM

## 2017-12-20 DIAGNOSIS — I10 HTN (HYPERTENSION), BENIGN: ICD-10-CM

## 2017-12-20 PROCEDURE — 99310 SBSQ NF CARE HIGH MDM 45: CPT | Performed by: FAMILY MEDICINE

## 2018-01-01 ENCOUNTER — NURSING HOME VISIT (OUTPATIENT)
Dept: GERIATRICS | Facility: CLINIC | Age: 83
End: 2018-01-01
Payer: COMMERCIAL

## 2018-01-01 ENCOUNTER — NURSING HOME VISIT (OUTPATIENT)
Dept: FAMILY MEDICINE | Facility: CLINIC | Age: 83
End: 2018-01-01
Payer: COMMERCIAL

## 2018-01-01 ENCOUNTER — HOSPITAL LABORATORY (OUTPATIENT)
Facility: OTHER | Age: 83
End: 2018-01-01

## 2018-01-01 ENCOUNTER — CLINICAL UPDATE (OUTPATIENT)
Dept: PHARMACY | Facility: CLINIC | Age: 83
End: 2018-01-01

## 2018-01-01 VITALS
TEMPERATURE: 98 F | WEIGHT: 145.4 LBS | SYSTOLIC BLOOD PRESSURE: 150 MMHG | RESPIRATION RATE: 23 BRPM | OXYGEN SATURATION: 98 % | BODY MASS INDEX: 23.37 KG/M2 | HEIGHT: 66 IN | HEART RATE: 67 BPM | DIASTOLIC BLOOD PRESSURE: 73 MMHG

## 2018-01-01 VITALS
TEMPERATURE: 98.4 F | RESPIRATION RATE: 18 BRPM | HEIGHT: 66 IN | SYSTOLIC BLOOD PRESSURE: 116 MMHG | OXYGEN SATURATION: 96 % | DIASTOLIC BLOOD PRESSURE: 58 MMHG | HEART RATE: 70 BPM | WEIGHT: 147 LBS | BODY MASS INDEX: 23.63 KG/M2

## 2018-01-01 VITALS
BODY MASS INDEX: 22.52 KG/M2 | WEIGHT: 139.5 LBS | SYSTOLIC BLOOD PRESSURE: 132 MMHG | TEMPERATURE: 98.2 F | DIASTOLIC BLOOD PRESSURE: 78 MMHG | RESPIRATION RATE: 22 BRPM | OXYGEN SATURATION: 95 % | HEART RATE: 89 BPM

## 2018-01-01 VITALS
BODY MASS INDEX: 23.66 KG/M2 | HEART RATE: 82 BPM | DIASTOLIC BLOOD PRESSURE: 84 MMHG | TEMPERATURE: 97.6 F | RESPIRATION RATE: 18 BRPM | WEIGHT: 146.6 LBS | OXYGEN SATURATION: 93 % | SYSTOLIC BLOOD PRESSURE: 138 MMHG

## 2018-01-01 VITALS
HEART RATE: 84 BPM | BODY MASS INDEX: 21.81 KG/M2 | DIASTOLIC BLOOD PRESSURE: 71 MMHG | SYSTOLIC BLOOD PRESSURE: 126 MMHG | TEMPERATURE: 98.7 F | RESPIRATION RATE: 18 BRPM | WEIGHT: 135.1 LBS | OXYGEN SATURATION: 97 %

## 2018-01-01 VITALS
HEIGHT: 66 IN | TEMPERATURE: 98.4 F | DIASTOLIC BLOOD PRESSURE: 69 MMHG | RESPIRATION RATE: 18 BRPM | OXYGEN SATURATION: 98 % | SYSTOLIC BLOOD PRESSURE: 134 MMHG | HEART RATE: 62 BPM | BODY MASS INDEX: 24.11 KG/M2 | WEIGHT: 150 LBS

## 2018-01-01 VITALS
HEIGHT: 66 IN | RESPIRATION RATE: 22 BRPM | BODY MASS INDEX: 22.16 KG/M2 | SYSTOLIC BLOOD PRESSURE: 144 MMHG | OXYGEN SATURATION: 97 % | TEMPERATURE: 97 F | WEIGHT: 137.9 LBS | DIASTOLIC BLOOD PRESSURE: 87 MMHG | HEART RATE: 76 BPM

## 2018-01-01 VITALS
RESPIRATION RATE: 19 BRPM | OXYGEN SATURATION: 96 % | HEART RATE: 73 BPM | DIASTOLIC BLOOD PRESSURE: 64 MMHG | TEMPERATURE: 98.2 F | SYSTOLIC BLOOD PRESSURE: 138 MMHG | HEIGHT: 66 IN | WEIGHT: 144 LBS | OXYGEN SATURATION: 93 % | RESPIRATION RATE: 21 BRPM | WEIGHT: 153.3 LBS | BODY MASS INDEX: 24.64 KG/M2 | SYSTOLIC BLOOD PRESSURE: 148 MMHG | DIASTOLIC BLOOD PRESSURE: 77 MMHG | BODY MASS INDEX: 23.24 KG/M2 | HEART RATE: 84 BPM | TEMPERATURE: 98.4 F

## 2018-01-01 VITALS
BODY MASS INDEX: 25.82 KG/M2 | RESPIRATION RATE: 24 BRPM | OXYGEN SATURATION: 93 % | HEART RATE: 75 BPM | TEMPERATURE: 99.1 F | DIASTOLIC BLOOD PRESSURE: 86 MMHG | WEIGHT: 160 LBS | SYSTOLIC BLOOD PRESSURE: 159 MMHG

## 2018-01-01 VITALS
DIASTOLIC BLOOD PRESSURE: 69 MMHG | TEMPERATURE: 97.8 F | BODY MASS INDEX: 16.77 KG/M2 | WEIGHT: 103.9 LBS | HEART RATE: 90 BPM | OXYGEN SATURATION: 97 % | SYSTOLIC BLOOD PRESSURE: 127 MMHG | RESPIRATION RATE: 18 BRPM

## 2018-01-01 DIAGNOSIS — F51.01 PRIMARY INSOMNIA: ICD-10-CM

## 2018-01-01 DIAGNOSIS — Z99.81 O2 DEPENDENT: ICD-10-CM

## 2018-01-01 DIAGNOSIS — F03.91 DEMENTIA WITH BEHAVIORAL DISTURBANCE, UNSPECIFIED DEMENTIA TYPE: ICD-10-CM

## 2018-01-01 DIAGNOSIS — F02.80 LATE ONSET ALZHEIMER'S DISEASE WITHOUT BEHAVIORAL DISTURBANCE (H): ICD-10-CM

## 2018-01-01 DIAGNOSIS — J96.10 CHRONIC RESPIRATORY FAILURE, UNSPECIFIED WHETHER WITH HYPOXIA OR HYPERCAPNIA (H): Primary | ICD-10-CM

## 2018-01-01 DIAGNOSIS — G30.1 LATE ONSET ALZHEIMER'S DISEASE WITHOUT BEHAVIORAL DISTURBANCE (H): ICD-10-CM

## 2018-01-01 DIAGNOSIS — G30.1 LATE ONSET ALZHEIMER'S DISEASE WITHOUT BEHAVIORAL DISTURBANCE (H): Primary | ICD-10-CM

## 2018-01-01 DIAGNOSIS — I50.21 ACUTE SYSTOLIC HEART FAILURE (H): Primary | ICD-10-CM

## 2018-01-01 DIAGNOSIS — J42 CHRONIC BRONCHITIS, UNSPECIFIED CHRONIC BRONCHITIS TYPE (H): Primary | ICD-10-CM

## 2018-01-01 DIAGNOSIS — R54 FRAIL ELDERLY: ICD-10-CM

## 2018-01-01 DIAGNOSIS — I27.20 PULMONARY HYPERTENSION (H): ICD-10-CM

## 2018-01-01 DIAGNOSIS — I48.0 INTERMITTENT ATRIAL FIBRILLATION (H): ICD-10-CM

## 2018-01-01 DIAGNOSIS — F41.1 GAD (GENERALIZED ANXIETY DISORDER): ICD-10-CM

## 2018-01-01 DIAGNOSIS — J45.909 MODERATE ASTHMA WITHOUT COMPLICATION, UNSPECIFIED WHETHER PERSISTENT: ICD-10-CM

## 2018-01-01 DIAGNOSIS — G47.00 INSOMNIA, UNSPECIFIED TYPE: ICD-10-CM

## 2018-01-01 DIAGNOSIS — J42 CHRONIC BRONCHITIS, UNSPECIFIED CHRONIC BRONCHITIS TYPE (H): ICD-10-CM

## 2018-01-01 DIAGNOSIS — I10 HTN (HYPERTENSION), BENIGN: ICD-10-CM

## 2018-01-01 DIAGNOSIS — Z51.5 HOSPICE CARE PATIENT: ICD-10-CM

## 2018-01-01 DIAGNOSIS — Z87.81 HISTORY OF COMPRESSION FRACTURE OF SPINE: ICD-10-CM

## 2018-01-01 DIAGNOSIS — I50.30 (HFPEF) HEART FAILURE WITH PRESERVED EJECTION FRACTION (H): ICD-10-CM

## 2018-01-01 DIAGNOSIS — R60.0 PERIPHERAL EDEMA: ICD-10-CM

## 2018-01-01 DIAGNOSIS — L03.031 CELLULITIS OF TOE OF RIGHT FOOT: Primary | ICD-10-CM

## 2018-01-01 DIAGNOSIS — F02.80 LATE ONSET ALZHEIMER'S DISEASE WITHOUT BEHAVIORAL DISTURBANCE (H): Primary | ICD-10-CM

## 2018-01-01 DIAGNOSIS — H61.23 BILATERAL IMPACTED CERUMEN: ICD-10-CM

## 2018-01-01 LAB
ANION GAP SERPL CALCULATED.3IONS-SCNC: 3 MMOL/L (ref 3–14)
ANION GAP SERPL CALCULATED.3IONS-SCNC: 3 MMOL/L (ref 3–14)
BUN SERPL-MCNC: 13 MG/DL (ref 7–30)
BUN SERPL-MCNC: 15 MG/DL (ref 7–30)
CALCIUM SERPL-MCNC: 8.3 MG/DL (ref 8.5–10.1)
CALCIUM SERPL-MCNC: 8.7 MG/DL (ref 8.5–10.1)
CHLORIDE SERPL-SCNC: 98 MMOL/L (ref 94–109)
CHLORIDE SERPL-SCNC: 99 MMOL/L (ref 94–109)
CO2 SERPL-SCNC: 36 MMOL/L (ref 20–32)
CO2 SERPL-SCNC: 38 MMOL/L (ref 20–32)
CREAT SERPL-MCNC: 0.56 MG/DL (ref 0.52–1.04)
CREAT SERPL-MCNC: 0.63 MG/DL (ref 0.52–1.04)
ERYTHROCYTE [DISTWIDTH] IN BLOOD BY AUTOMATED COUNT: 13.5 % (ref 10–15)
GFR SERPL CREATININE-BSD FRML MDRD: 88 ML/MIN/1.7M2
GFR SERPL CREATININE-BSD FRML MDRD: >90 ML/MIN/1.7M2
GLUCOSE SERPL-MCNC: 76 MG/DL (ref 70–99)
GLUCOSE SERPL-MCNC: 76 MG/DL (ref 70–99)
HCT VFR BLD AUTO: 41.9 % (ref 35–47)
HGB BLD-MCNC: 12.5 G/DL (ref 11.7–15.7)
MCH RBC QN AUTO: 30.4 PG (ref 26.5–33)
MCHC RBC AUTO-ENTMCNC: 29.8 G/DL (ref 31.5–36.5)
MCV RBC AUTO: 102 FL (ref 78–100)
PLATELET # BLD AUTO: 208 10E9/L (ref 150–450)
POTASSIUM SERPL-SCNC: 3.8 MMOL/L (ref 3.4–5.3)
POTASSIUM SERPL-SCNC: 4 MMOL/L (ref 3.4–5.3)
RBC # BLD AUTO: 4.11 10E12/L (ref 3.8–5.2)
SODIUM SERPL-SCNC: 137 MMOL/L (ref 133–144)
SODIUM SERPL-SCNC: 140 MMOL/L (ref 133–144)
WBC # BLD AUTO: 6.8 10E9/L (ref 4–11)

## 2018-01-01 PROCEDURE — 99309 SBSQ NF CARE MODERATE MDM 30: CPT | Performed by: NURSE PRACTITIONER

## 2018-01-01 PROCEDURE — 99310 SBSQ NF CARE HIGH MDM 45: CPT | Performed by: NURSE PRACTITIONER

## 2018-01-01 PROCEDURE — 99318 ZZC ANNUAL NURSING FAC ASSESSMNT, STABLE: CPT | Performed by: NURSE PRACTITIONER

## 2018-01-01 PROCEDURE — 99308 SBSQ NF CARE LOW MDM 20: CPT | Performed by: NURSE PRACTITIONER

## 2018-01-01 PROCEDURE — 99310 SBSQ NF CARE HIGH MDM 45: CPT | Performed by: FAMILY MEDICINE

## 2018-01-01 PROCEDURE — 99309 SBSQ NF CARE MODERATE MDM 30: CPT | Performed by: FAMILY MEDICINE

## 2018-01-01 RX ORDER — IPRATROPIUM BROMIDE AND ALBUTEROL SULFATE 2.5; .5 MG/3ML; MG/3ML
1 SOLUTION RESPIRATORY (INHALATION) 4 TIMES DAILY
Qty: 90 VIAL | Refills: 1 | COMMUNITY
Start: 2018-01-01

## 2018-01-01 RX ORDER — POTASSIUM CHLORIDE 750 MG/1
20 CAPSULE, EXTENDED RELEASE ORAL 2 TIMES DAILY
COMMUNITY
End: 2019-01-01

## 2018-01-01 RX ORDER — POTASSIUM CHLORIDE 1.5 G/1.58G
20 POWDER, FOR SOLUTION ORAL DAILY
COMMUNITY
Start: 2018-01-01 | End: 2019-01-01

## 2018-01-16 ENCOUNTER — NURSING HOME VISIT (OUTPATIENT)
Dept: FAMILY MEDICINE | Facility: CLINIC | Age: 83
End: 2018-01-16
Payer: COMMERCIAL

## 2018-01-16 VITALS
RESPIRATION RATE: 19 BRPM | DIASTOLIC BLOOD PRESSURE: 74 MMHG | HEART RATE: 104 BPM | OXYGEN SATURATION: 94 % | SYSTOLIC BLOOD PRESSURE: 128 MMHG | WEIGHT: 130.4 LBS | TEMPERATURE: 97.7 F | BODY MASS INDEX: 21.05 KG/M2

## 2018-01-16 DIAGNOSIS — G30.1 LATE ONSET ALZHEIMER'S DISEASE WITHOUT BEHAVIORAL DISTURBANCE (H): ICD-10-CM

## 2018-01-16 DIAGNOSIS — J06.9 VIRAL URI: ICD-10-CM

## 2018-01-16 DIAGNOSIS — F02.80 LATE ONSET ALZHEIMER'S DISEASE WITHOUT BEHAVIORAL DISTURBANCE (H): ICD-10-CM

## 2018-01-16 DIAGNOSIS — J42 CHRONIC BRONCHITIS, UNSPECIFIED CHRONIC BRONCHITIS TYPE (H): Primary | ICD-10-CM

## 2018-01-16 PROCEDURE — 99310 SBSQ NF CARE HIGH MDM 45: CPT | Performed by: NURSE PRACTITIONER

## 2018-01-16 NOTE — PROGRESS NOTES
[place] [facility]    Chief Complaint   Patient presents with     Nursing Home Acute       HPI:    Benson Kapoor is a 89 year old  (5/20/1925), who is being seen today for an episodic care visit at [facility].    HPI information obtained from: facility chart records, facility staff, patient report and family/first contact sons report. Today's concern is:  Asked by nursing to see patient due to concerns with cough that started today   Patient is on oxygen   She denies any severe shortness of breath, does state that she has a cough   Many resident here have a viral cough in the past week     She is on qvar and duo nebs- scheduled     Note from nursing that patients son's concerned with the cost of her qvar and cymbalta     unclear exactly why she is on cymbalta-  She denies feeling depressed   She participates in activities  No behavioral concerns from nursing staff        REVIEW OF SYSTEMS:  Unobtainable secondary to cognitive impairment or aphasia.    /74  Pulse 104  Temp 97.7  F (36.5  C)  Resp 19  Wt 130 lb 6.4 oz (59.1 kg)  SpO2 94%  BMI 21.05 kg/m2  GENERAL APPEARANCE:  Alert, in no distress  RESP:  respiratory effort and palpation of chest normal, auscultation of lungs fine crackles in bases , no respiratory distress  CV:  Palpation and auscultation of heart done , rate and rhythm WNL, no murmur, no peripheral edema  ABDOMEN:  normal bowel sounds, soft, nontender, no hepatosplenomegaly or other masses  M/S:   Gait and station wheelchair bound, Digits and nails WNL  SKIN:  Inspection and Palpation of skin and subcutaneous tissue WNL  NEURO: 2-12 in normal limits and at patient's baseline  PSYCH:  insight and judgement, memory impaired , affect and mood normal    ASSESSMENT/PLAN:  Viral URI  Mild symptoms   Will monitor closely   Resume current inhalers     Chronic bronchitis, unspecified chronic bronchitis type (H)  Does not appear exacerbation at this time   Will  monitor    Late onset Alzheimer's disease without behavioral disturbance  Om cymbalta   Unclear reasoning for this   Discussed with sons stopping this  They are in agreement         Orders:    1.  DC Cymbalta--monitor for s/s depression  2.  I have contacted pharmacy for assistance on price concerns for Qvar.  3.  Please monitor for worsening cough, fever and check O2 sats every shift--monitor for worsening hypoxia.    I contacted [facility] regarding the concerns with price of prescription they were doing billing her Part D. This has been addressed.     Total time spent with patient visit at the skilled nursing facility was 35 min including patient visit, review of past records, phone call to patient contact and phamracy. Greater than 50% of total time spent with counseling and coordinating care due to impaired memory    Electronically signed by:  Rajni Shah NP

## 2018-01-16 NOTE — MR AVS SNAPSHOT
"              After Visit Summary   2018    Benson Kapoor    MRN: [id number]           Patient Information     Date Of Birth          1925        Visit Information        Provider Department      2018 9:20 AM Rajni Shah NP [facility]        Today's Diagnoses     Chronic bronchitis, unspecified chronic bronchitis type (H)    -  1    Viral URI        Late onset Alzheimer's disease without behavioral disturbance           Follow-ups after your visit        Who to contact     If you have questions or need follow up information about today's clinic visit or your schedule please contact [facility] directly at [phone].  Normal or non-critical lab and imaging results will be communicated to you by ioSemanticshart, letter or phone within 4 business days after the clinic has received the results. If you do not hear from us within 7 days, please contact the clinic through ioSemanticshart or phone. If you have a critical or abnormal lab result, we will notify you by phone as soon as possible.  Submit refill requests through [employer] or call your pharmacy and they will forward the refill request to us. Please allow 3 business days for your refill to be completed.          Additional Information About Your Visit        MyChart Information     [employer] lets you send messages to your doctor, view your test results, renew your prescriptions, schedule appointments and more. To sign up, go to [email or web address] . Click on \"Log in\" on the left side of the screen, which will take you to the Welcome page. Then click on \"Sign up Now\" on the right side of the page.     You will be asked to enter the access code listed below, as well as some personal information. Please follow the directions to create your username and password.     Your access code is: [email or web address]  Expires: 2018  2:09 PM     Your access code will  in 90 days. If you need help or a new code, please call your " [facility] or [phone].        Care EveryWhere ID     This is your Care EveryWhere ID. This could be used by other organizations to access your [facility] medical records  [id number]        Your Vitals Were     Pulse Temperature Respirations Pulse Oximetry BMI (Body Mass Index)       104 97.7  F (36.5  C) 19 94% 21.05 kg/m2        Blood Pressure from Last 3 Encounters:   01/16/18 128/74   12/19/17 137/75   11/24/17 136/71    Weight from Last 3 Encounters:   01/16/18 130 lb 6.4 oz (59.1 kg)   12/19/17 131 lb 14.4 oz (59.8 kg)   11/24/17 127 lb (57.6 kg)              Today, you had the following     No orders found for display         Today's Medication Changes          These changes are accurate as of: 1/16/18  2:09 PM.  If you have any questions, ask your nurse or doctor.               Stop taking these medicines if you haven't already. Please contact your care team if you have questions.     CYMBALTA PO   Stopped by:  Rajni Shah, NP                    Primary Care Provider    Rajni Shah, APRN CNP       No address on file        Equal Access to Services     [facility]: Hadii cristóbal bowen haddianeo Sogreg, waaxda luqadaha, qaybta kaalmada adeeloiseyada, bernice leone . So [facility] [phone].    ATENCIÓN: Si habla español, tiene a isaac disposición servicios gratuitos de asistencia lingüística. Llame al [phone].    We comply with applicable federal civil rights laws and [place] laws. We do not discriminate on the basis of race, color, national origin, age, disability, sex, sexual orientation, or gender identity.            Thank you!     Thank you for choosing [facility]  for your care. Our goal is always to provide you with excellent care. Hearing back from our patients is one way we can continue to improve our services. Please take a few minutes to complete the written survey that you may receive in the mail after your visit with us. Thank you!              Your Updated Medication List - Protect others around you: Learn how to safely use, store and throw away your medicines at www.disposemymeds.org.          This list is accurate as of: 1/16/18  2:09 PM.  Always use your most recent med list.                   Brand Name Dispense Instructions for use Diagnosis    albuterol (2.5 MG/3ML) 0.083% neb solution      Take 1 vial by nebulization every 6 hours as needed        beclomethasone 40 MCG/ACT Inhaler    QVAR     Inhale 1 puff into the lungs 2 times daily        CALTRATE 600 PLUS-VIT D OR      Take 1 tablet by mouth 2 times daily.        DUONEB 0.5-2.5 (3) MG/3ML neb solution   Generic drug:  ipratropium - albuterol 0.5 mg/2.5 mg/3 mL      Take 1 vial by nebulization 4 times daily        MILK OF MAGNESIA PO      Take 30 mLs by mouth as needed.        sennosides 8.6 MG tablet    SENOKOT     Take 1 tablet by mouth daily        TYLENOL EX ST ARTHRITIS PAIN 500 MG tablet   Generic drug:  acetaminophen      Take 650 mg by mouth 3 times daily And 650 mg every 4 hours prn        VITAMIN D (CHOLECALCIFEROL) PO      Take 600 Units by mouth daily

## 2018-01-17 ENCOUNTER — NURSING HOME VISIT (OUTPATIENT)
Dept: GERIATRICS | Facility: CLINIC | Age: 83
End: 2018-01-17
Payer: COMMERCIAL

## 2018-01-17 VITALS
WEIGHT: 130.4 LBS | RESPIRATION RATE: 18 BRPM | DIASTOLIC BLOOD PRESSURE: 88 MMHG | OXYGEN SATURATION: 94 % | TEMPERATURE: 98 F | HEART RATE: 85 BPM | SYSTOLIC BLOOD PRESSURE: 135 MMHG | BODY MASS INDEX: 21.05 KG/M2

## 2018-01-17 DIAGNOSIS — J96.21 ACUTE AND CHRONIC RESPIRATORY FAILURE WITH HYPOXIA (H): Primary | ICD-10-CM

## 2018-01-17 DIAGNOSIS — J42 CHRONIC BRONCHITIS, UNSPECIFIED CHRONIC BRONCHITIS TYPE (H): ICD-10-CM

## 2018-01-17 DIAGNOSIS — J18.9 PNEUMONIA OF RIGHT LOWER LOBE DUE TO INFECTIOUS ORGANISM: ICD-10-CM

## 2018-01-17 PROCEDURE — 99310 SBSQ NF CARE HIGH MDM 45: CPT | Performed by: FAMILY MEDICINE

## 2018-01-17 NOTE — PROGRESS NOTES
[place] [facility]    Chief Complaint   Patient presents with     Nursing Home Acute       HPI:    Benson Kapoor is a 89 year old  (5/20/1925), who is being seen today for an episodic care visit at [facility].    HPI information obtained from: facility staff and patient report.     Today's concern is:  ---------------------------  -Reports that resident was complaining of shortness of breath was found to be edy, sweating profusely and gasping for breath last night the signs were as follows temperature 98.7, pulse 135, respiratory rate 21, oxygen saturation was 74% blood pressure 101/80.  Oxygen at 3 levels.  Se is audibly wheezing, coarse crackles throughout the lung exam.  Given as needed albuterol nebulizer patient refused to go to ED if he has had improved to 96% on 3 L.   - Resident seen and examined, reports productive cough with yellowish sputum, stuffy nose, nasal congestion; denies myalgia or arthralgia, fever or chills.   - Son came to the room to drop his mother from the dinning room, but elected not to be present during this visit.     REVIEW OF SYSTEMS:  4 point ROS including Respiratory, CV, GI and , other than that noted in the HPI,  is negative    /88  Pulse 85  Temp 98  F (36.7  C)  Resp 18  Wt 130 lb 6.4 oz (59.1 kg)  SpO2 94%  BMI 21.05 kg/m2   GENERAL APPEARANCE: awake, non cooperative  ENT: dry oral mucosa, NC in place  EYES:  EOM, conjunctivae, lids, pupils and irises normal  RESP:  respiratory effort and palpation of chest normal, coarse sound over posterior lung, diminished BS at the bases, no wheezing   CV:  Palpation and auscultation of heart done ,fast and  Irregular rate and rhythm, no murmur, rub, or gallop, no edema  ABDOMEN:  normal bowel sounds, soft, nontender, no hepatosplenomegaly or other masses  M/S:   Gait and station abnormal uses WC, self proper.   SKIN:  tight and dry skin over legs. Mole 1x1 cm black over right cheek.   NEURO:   Cranial  nerves 2-12 are normal tested and grossly at patient's baseline, no purposeful movement in upper and lower extremities  PSYCH: tired looking, anxious and restless    ASSESSMENT/PLAN:  -Patient has hypoxia, tachycardia and tachypnea with soft blood pressure last night, oxygenation  improved with oxygen 3 L.  History of asthma/COPD on beclomethasone, and albuterol and DuoNeb as needed. Possible LRTI.   -We will order x-ray to rule out pneumonia, CBC with differential, continue breathing treatment.    -Chest x-ray was read by the RN the phone as density in the right lower lobe possible infiltration.  We will start Augmentin 875 twice daily for 5 days  - Continue O2 for hypoxia to keep level b/w 88-92%.   - continue to follow closely.   - recommend comfort care approach.       Electronically signed by:  Leida Johnson MD

## 2018-01-18 ENCOUNTER — HOSPITAL LABORATORY (OUTPATIENT)
Facility: OTHER | Age: 83
End: 2018-01-18

## 2018-01-18 ENCOUNTER — NURSING HOME VISIT (OUTPATIENT)
Dept: FAMILY MEDICINE | Facility: CLINIC | Age: 83
End: 2018-01-18
Payer: COMMERCIAL

## 2018-01-18 VITALS
WEIGHT: 124.9 LBS | OXYGEN SATURATION: 98 % | DIASTOLIC BLOOD PRESSURE: 75 MMHG | TEMPERATURE: 97.3 F | BODY MASS INDEX: 20.16 KG/M2 | SYSTOLIC BLOOD PRESSURE: 112 MMHG | RESPIRATION RATE: 31 BRPM | HEART RATE: 101 BPM

## 2018-01-18 DIAGNOSIS — F02.80 LATE ONSET ALZHEIMER'S DISEASE WITHOUT BEHAVIORAL DISTURBANCE (H): ICD-10-CM

## 2018-01-18 DIAGNOSIS — G30.1 LATE ONSET ALZHEIMER'S DISEASE WITHOUT BEHAVIORAL DISTURBANCE (H): ICD-10-CM

## 2018-01-18 DIAGNOSIS — R54 FRAIL ELDERLY: ICD-10-CM

## 2018-01-18 DIAGNOSIS — J42 CHRONIC BRONCHITIS, UNSPECIFIED CHRONIC BRONCHITIS TYPE (H): ICD-10-CM

## 2018-01-18 DIAGNOSIS — J18.9 PNEUMONIA OF RIGHT LOWER LOBE DUE TO INFECTIOUS ORGANISM: Primary | ICD-10-CM

## 2018-01-18 LAB
BASOPHILS # BLD AUTO: 0 10E9/L (ref 0–0.2)
BASOPHILS NFR BLD AUTO: 0.1 %
DIFFERENTIAL METHOD BLD: ABNORMAL
EOSINOPHIL # BLD AUTO: 0 10E9/L (ref 0–0.7)
EOSINOPHIL NFR BLD AUTO: 0.2 %
ERYTHROCYTE [DISTWIDTH] IN BLOOD BY AUTOMATED COUNT: 13.1 % (ref 10–15)
HCT VFR BLD AUTO: 38.8 % (ref 35–47)
HGB BLD-MCNC: 12.2 G/DL (ref 11.7–15.7)
LYMPHOCYTES # BLD AUTO: 0.9 10E9/L (ref 0.8–5.3)
LYMPHOCYTES NFR BLD AUTO: 7.7 %
MCH RBC QN AUTO: 31 PG (ref 26.5–33)
MCHC RBC AUTO-ENTMCNC: 31.4 G/DL (ref 31.5–36.5)
MCV RBC AUTO: 99 FL (ref 78–100)
MONOCYTES # BLD AUTO: 0.8 10E9/L (ref 0–1.3)
MONOCYTES NFR BLD AUTO: 6.6 %
NEUTROPHILS # BLD AUTO: 9.8 10E9/L (ref 1.6–8.3)
NEUTROPHILS NFR BLD AUTO: 85.4 %
PLATELET # BLD AUTO: 253 10E9/L (ref 150–450)
RBC # BLD AUTO: 3.93 10E12/L (ref 3.8–5.2)
WBC # BLD AUTO: 11.5 10E9/L (ref 4–11)

## 2018-01-18 PROCEDURE — 99310 SBSQ NF CARE HIGH MDM 45: CPT | Performed by: NURSE PRACTITIONER

## 2018-01-18 NOTE — PROGRESS NOTES
[place] [facility]    Chief Complaint   Patient presents with     Nursing Home Acute       HPI:    Benson Kapoor is a 89 year old  (5/20/1925), who is being seen today for an episodic care visit at [facility].    HPI information obtained from: facility chart records, facility staff and patient report. Today's concern is:  Pneumonia of right lower lobe due to infectious organism (H)  Chronic bronchitis, unspecified chronic bronchitis type (H)  Patient had a CXR showing RLL pneumonia was started on augmentin   She is on chronic oxygen at 2-3 liters   Patient refused to go to the EMERGENCY ROOM yesterday  Nursing staff and family report that patient often talks about being ready to die   Today she is stable   Had a discussion with patient and sons regarding her wishing and that she would be appropriate for hospice   Patient tells that she desire no further hospitalizations/ EMERGENCY ROOM visits     She has been noted to be sleeping more   Her appetite is fair      01/18/18 124 lb 14.4 oz (56.7 kg)   01/17/18 130 lb 6.4 oz (59.1 kg)   01/16/18 130 lb 6.4 oz (59.1 kg)   12/19/17 131 lb 14.4 oz (59.8 kg)   11/24/17 127 lb (57.6 kg)   11/21/17 127 lb 8 oz (57.8 kg)   11/13/17 126 lb 12.8 oz (57.5 kg)   11/09/17 132 lb 9.6 oz (60.1 kg)   11/02/17 129 lb 3.2 oz (58.6 kg)       REVIEW OF SYSTEMS:  Unobtainable secondary to cognitive impairment or aphasia, but today pt reports having a cough    /75  Pulse 101  Temp 97.3  F (36.3  C)  Resp (!) 31  Wt 124 lb 14.4 oz (56.7 kg)  SpO2 98%  BMI 20.16 kg/m2  GENERAL APPEARANCE:  Alert, in no distress    RESP:  respiratory effort and palpation of chest normal, auscultation of lungs rhonchi in bilateral bases , no respiratory distress  CV:  Palpation and auscultation of heart done , rate and rhythm tachy, no murmur, no peripheral edema  ABDOMEN:  normal bowel sounds, soft, nontender, no hepatosplenomegaly or other masses  M/S:   Gait and station  wheelchair bound, Digits and nails WNL  NEURO: 2-12 in normal limits and at patient's baseline  PSYCH:  insight and judgement, memory impaired, affect and mood normal      ASSESSMENT/PLAN:     Pneumonia of right lower lobe due to infectious organism (H)  Chronic bronchitis, unspecified chronic bronchitis type (H)  Late onset Alzheimer's disease without behavioral disturbance  Frail elderly     Patient appears to be improving with tx of antibiotic   With long term goal of comfort and desire to not go to EMERGENCY ROOM or hospital   Referral placed for hospice  Orders:  1.  Referral place to  hospice.  Dx:  Pneumonia, oxygen dependent COPD, dementia  Total time spent with patient visit at the skilled nursing facility was 35 min including patient visit, review of past records and phone call to patient contact. Greater than 50% of total time spent with counseling and coordinating care due to impaired memory.         Electronically signed by:  Rajni Shah NP

## 2018-01-23 ENCOUNTER — NURSING HOME VISIT (OUTPATIENT)
Dept: FAMILY MEDICINE | Facility: CLINIC | Age: 83
End: 2018-01-23
Payer: COMMERCIAL

## 2018-01-23 VITALS
HEART RATE: 80 BPM | RESPIRATION RATE: 18 BRPM | DIASTOLIC BLOOD PRESSURE: 74 MMHG | SYSTOLIC BLOOD PRESSURE: 129 MMHG | WEIGHT: 124.9 LBS | BODY MASS INDEX: 20.16 KG/M2 | OXYGEN SATURATION: 94 % | TEMPERATURE: 98.2 F

## 2018-01-23 DIAGNOSIS — J18.9 PNEUMONIA OF RIGHT LOWER LOBE DUE TO INFECTIOUS ORGANISM: Primary | ICD-10-CM

## 2018-01-23 DIAGNOSIS — J42 CHRONIC BRONCHITIS, UNSPECIFIED CHRONIC BRONCHITIS TYPE (H): ICD-10-CM

## 2018-01-23 DIAGNOSIS — F02.80 LATE ONSET ALZHEIMER'S DISEASE WITHOUT BEHAVIORAL DISTURBANCE (H): ICD-10-CM

## 2018-01-23 DIAGNOSIS — G30.1 LATE ONSET ALZHEIMER'S DISEASE WITHOUT BEHAVIORAL DISTURBANCE (H): ICD-10-CM

## 2018-01-23 PROCEDURE — 99310 SBSQ NF CARE HIGH MDM 45: CPT | Performed by: NURSE PRACTITIONER

## 2018-01-23 NOTE — PROGRESS NOTES
[place] [facility]    Chief Complaint   Patient presents with     Nursing Home Acute       HPI:    Benson Kapoor is a 89 year old  (5/20/1925), who is being seen today for an episodic care visit at [facility].    HPI information obtained from: facility chart records, facility staff and patient report. Today's concern is:  Patient acute cough is improved  She is afebrile  apetite is good  Denies pain   She is particpating in activities   Sons are here  She has a hospice assessment over the weekend but was not enrolled due to not meeting criteria- I have contacted [facility] who is looking into this further   She has COPD on chronic oxygen 1-3 liters  Has a desire to no longer be hospitalized/go to the EMERGENCY ROOM   She was last hospitalized this past fall   She has lost weight in the past 6 months 10/5 weight was 138, today 124 lbs  She has had a functional decline as she is no longer living independently as of this past fall and is a long term care resident     REVIEW OF SYSTEMS:  Unobtainable secondary to cognitive impairment or aphasia, but today pt reports feels well    /74  Pulse 80  Temp 98.2  F (36.8  C)  Resp 18  Wt 124 lb 14.4 oz (56.7 kg)  SpO2 94%  BMI 20.16 kg/m2  GENERAL APPEARANCE:  Alert, in no distress, thin and appear chronically ill  RESP:  respiratory effort and palpation of chest normal, auscultation of lungs rhonchi in bilateral bases , no respiratory distress  CV:  Palpation and auscultation of heart done , rate and rhythm WNL, no murmur, trace peripheral edema  ABDOMEN:  normal bowel sounds, soft, nontender, no hepatosplenomegaly or other masses  M/S:   Gait and station wheelchair bound, Digits and nails intact  SKIN:  Inspection and Palpation of skin and subcutaneous tissue, there are 2 scabbed areas with mild erythema on right lower leg  NEURO: 2-12 in normal limits and at patient's baseline  PSYCH:  insight and judgement, memory impaired , affect  and mood normal      ASSESSMENT/PLAN:     Pneumonia of right lower lobe due to infectious organism (H)  Chronic bronchitis, unspecified chronic bronchitis type (H)  Late onset Alzheimer's disease without behavioral disturbance     Pneumonia improving  Hospice reevaluating case.      Total time spent with patient visit at the skilled nursing facility was 35 min including patient visit, review of past records and face to face visit with son and discussion with hospice. Greater than 50% of total time spent with counseling and coordinating care due to impaired memory.    Electronically signed by:  Rajni Shah NP

## 2018-02-15 ENCOUNTER — NURSING HOME VISIT (OUTPATIENT)
Dept: FAMILY MEDICINE | Facility: CLINIC | Age: 83
End: 2018-02-15
Payer: COMMERCIAL

## 2018-02-15 VITALS
DIASTOLIC BLOOD PRESSURE: 58 MMHG | RESPIRATION RATE: 19 BRPM | SYSTOLIC BLOOD PRESSURE: 107 MMHG | TEMPERATURE: 99.2 F | WEIGHT: 128.5 LBS | OXYGEN SATURATION: 98 % | HEART RATE: 82 BPM | BODY MASS INDEX: 20.74 KG/M2

## 2018-02-15 DIAGNOSIS — J45.30 MILD PERSISTENT ASTHMA WITHOUT COMPLICATION: ICD-10-CM

## 2018-02-15 DIAGNOSIS — J42 CHRONIC BRONCHITIS, UNSPECIFIED CHRONIC BRONCHITIS TYPE (H): Primary | ICD-10-CM

## 2018-02-15 DIAGNOSIS — F02.80 LATE ONSET ALZHEIMER'S DISEASE WITHOUT BEHAVIORAL DISTURBANCE (H): ICD-10-CM

## 2018-02-15 DIAGNOSIS — Z51.5 HOSPICE CARE PATIENT: ICD-10-CM

## 2018-02-15 DIAGNOSIS — G30.1 LATE ONSET ALZHEIMER'S DISEASE WITHOUT BEHAVIORAL DISTURBANCE (H): ICD-10-CM

## 2018-02-15 PROCEDURE — 99309 SBSQ NF CARE MODERATE MDM 30: CPT | Mod: GW | Performed by: NURSE PRACTITIONER

## 2018-02-15 RX ORDER — BUDESONIDE 0.5 MG/2ML
0.5 INHALANT ORAL 2 TIMES DAILY
COMMUNITY
Start: 2018-02-10 | End: 2018-02-15

## 2018-02-15 RX ORDER — MORPHINE SULFATE 20 MG/ML
2.5 SOLUTION ORAL
COMMUNITY
End: 2018-04-18

## 2018-02-15 RX ORDER — PREDNISONE 5 MG/1
2.5 TABLET ORAL DAILY
COMMUNITY
Start: 2018-01-01 | End: 2019-01-01

## 2018-02-15 NOTE — PROGRESS NOTES
[place] [facility]    Chief Complaint   Patient presents with     FDC Regulatory       HPI:    Benson Kapoor is a 89 year old  (5/20/1925), who is being seen today for a federally mandated E/M visit at [facility] .  HPI information obtained from: facility chart records and staff. Today's concerns are:  Chronic bronchitis, unspecified chronic bronchitis type (H)  Mild persistent asthma without complication  Stable on chronic oxygen   She is enrolled in hospice through [facility]     Late onset Alzheimer's disease without behavioral disturbance  Patient is stable   No behaviors   No change in mood with stopping her cymbalta     Hospice care patient  Patient is enrolled in hospice through [facility]  Doing well       She reports feeling constipated last night   Reviewed bowel records to has had a BM daily in AM       ALLERGIES: Review of patient's allergies indicates no known allergies.  PAST MEDICAL HISTORY:  has a past medical history of A fib; Anemia due to blood loss, acute (9/5/2012); DJD (degenerative joint disease); and HTN.  PAST SURGICAL HISTORY:  has a past surgical history that includes surgical history of -  and hysterectomy, cervix status unknown.  FAMILY HISTORY: family history is not on file.  SOCIAL HISTORY:  reports that she has never smoked. She has never used smokeless tobacco. She reports that she does not drink alcohol or use illicit drugs.    MEDICATIONS:  Current Outpatient Prescriptions   Medication Sig Dispense Refill     morphine sulfate HIGH CONCENTRATE (ROXANOL *CONCENTRATED*) 20 mg/mL (HIGH CONC) solution Take 2.5 mg by mouth every 2 hours as needed for shortness of breath / dyspnea or moderate to severe pain       predniSONE (DELTASONE) 5 MG tablet Take 1 tablet (5 mg) by mouth daily       ipratropium - albuterol 0.5 mg/2.5 mg/3 mL (DUONEB) 0.5-2.5 (3) MG/3ML neb solution Take 1 vial by nebulization 4 times daily       albuterol (2.5 MG/3ML) 0.083% neb  solution Take 1 vial by nebulization every 6 hours as needed        sennosides (SENOKOT) 8.6 MG tablet Take 1 tablet by mouth 2 times daily        Magnesium Hydroxide (MILK OF MAGNESIA PO) Take 30 mLs by mouth as needed.       Calcium-Vitamin D (CALTRATE 600 PLUS-VIT D OR) Take 1 tablet by mouth 2 times daily.       acetaminophen (TYLENOL EX ST ARTHRITIS PAIN) 500 MG tablet Take 650 mg by mouth 3 times daily And 650 mg every 4 hours prn       VITAMIN D, CHOLECALCIFEROL, PO Take 600 Units by mouth daily       Medications reviewed:  Medications reconciled to facility chart and changes were made to reflect current medications as identified as above med list. Below are the changes that were made:   Medications stopped since last EPIC medication reconciliation:   Medications Discontinued During This Encounter   Medication Reason     beclomethasone (QVAR) 40 MCG/ACT Inhaler        Medications started since last [facility] medication reconciliation:  Orders Placed This Encounter   Medications     morphine sulfate HIGH CONCENTRATE (ROXANOL *CONCENTRATED*) 20 mg/mL (HIGH CONC) solution     Sig: Take 2.5 mg by mouth every 2 hours as needed for shortness of breath / dyspnea or moderate to severe pain     budesonide (PULMICORT) 0.5 MG/2ML neb solution     Sig: Take 0.5 mg by nebulization 2 times daily         Case Management:  I have reviewed the care plan and MDS and do agree with the plan. Patient's desire to return to the community is not present.  Information reviewed:  Medications, vital signs, orders, and nursing notes.    ROS:  Limited secondary to cognitive impairment but today pt reports constipation    Exam:  Vitals: /58  Pulse 82  Temp 99.2  F (37.3  C)  Resp 19  Wt 128 lb 8 oz (58.3 kg)  SpO2 98%  BMI 20.74 kg/m2  BMI= Body mass index is 20.74 kg/(m^2).  GENERAL APPEARANCE:  Alert, in no distress  RESP:  respiratory effort and palpation of chest normal, lungs diminished in bases  CV:  Palpation and  auscultation of heart done , regular rate and rhythm, no murmur, rub, or gallop  ABDOMEN:  normal bowel sounds, soft, nontender, no hepatosplenomegaly or other masses  M/S:   Gait and station abnormal wheelchair bound  Digits and nails normal  SKIN:  Inspection of skin and subcutaneous tissue baseline, Palpation of skin and subcutaneous tissue baseline  NEURO:   Cranial nerves 2-12 are normal tested and grossly at patient's baseline  PSYCH:  insight and judgement impaired, memory impaired     Lab/Diagnostic data:     CBC RESULTS:   Recent Labs   Lab Test  01/18/18   0600  11/22/17   0755   WBC  11.5*  4.7   RBC  3.93  4.05   HGB  12.2  12.7   HCT  38.8  39.1   MCV  99  97   MCH  31.0  31.4   MCHC  31.4*  32.5   RDW  13.1  14.1   PLT  253  136*       Last Basic Metabolic Panel:  Recent Labs   Lab Test  11/22/17   0755  10/12/17   0600   NA  140  138   POTASSIUM  4.0  4.3   CHLORIDE  99  103   OG  8.6  8.8   CO2  34*  30   BUN  13  12   CR  0.43*  0.48*   GLC  85  84       Liver Function Studies -   Recent Labs   Lab Test  03/22/12   0920  12/26/11   1135   PROTTOTAL  7.0  7.6   ALBUMIN  3.6  4.0   BILITOTAL  0.4  0.5   ALKPHOS  78  76   AST  27  29   ALT  9  12       No results found for: TSH]    No results found for: A1C    ASSESSMENT/PLAN     Chronic bronchitis, unspecified chronic bronchitis type (H)  Mild persistent asthma without complication  Late onset Alzheimer's disease without behavioral disturbance  Hospice care patient    Orders:  1.  Increase Senna S to 1 tab BID for constipation    Total time spent with patient visit at the skilled nursing facility was 25 min including patient visit and review of past records. Greater than 50% of total time spent with counseling and coordinating care due to impaired cognition.    Electronically signed by:  Rajni Shah NP

## 2018-02-20 ENCOUNTER — HOSPITAL LABORATORY (OUTPATIENT)
Facility: OTHER | Age: 83
End: 2018-02-20

## 2018-02-20 ENCOUNTER — NURSING HOME VISIT (OUTPATIENT)
Dept: FAMILY MEDICINE | Facility: CLINIC | Age: 83
End: 2018-02-20
Payer: COMMERCIAL

## 2018-02-20 VITALS
WEIGHT: 128.5 LBS | BODY MASS INDEX: 20.74 KG/M2 | TEMPERATURE: 99.2 F | OXYGEN SATURATION: 94 % | DIASTOLIC BLOOD PRESSURE: 58 MMHG | HEART RATE: 82 BPM | SYSTOLIC BLOOD PRESSURE: 107 MMHG | RESPIRATION RATE: 19 BRPM

## 2018-02-20 DIAGNOSIS — J42 CHRONIC BRONCHITIS, UNSPECIFIED CHRONIC BRONCHITIS TYPE (H): ICD-10-CM

## 2018-02-20 DIAGNOSIS — J10.1 INFLUENZA B: Primary | ICD-10-CM

## 2018-02-20 DIAGNOSIS — Z51.5 HOSPICE CARE PATIENT: ICD-10-CM

## 2018-02-20 LAB
FLUAV+FLUBV AG SPEC QL: NEGATIVE
FLUAV+FLUBV AG SPEC QL: POSITIVE
SPECIMEN SOURCE: ABNORMAL

## 2018-02-20 PROCEDURE — 99309 SBSQ NF CARE MODERATE MDM 30: CPT | Mod: GW | Performed by: NURSE PRACTITIONER

## 2018-02-20 NOTE — PROGRESS NOTES
"[place] [facility]    Chief Complaint   Patient presents with     Nursing Home Acute       HPI:    Benson Kapoor is a 89 year old  (5/20/1925), who is being seen today for an episodic care visit at [facility].    HPI information obtained from: facility chart records, facility staff and family/first contact 3 sons present during visit  report. Today's concern is:  Nursing staff report that patient had decreased LOC last night, low grade fever and oxygen sats dropped as low as 40% on 2 liters of oxygen   She is and was not in any distress, appears comfortable  Hospice through [facility] is involved in her care  Staff called family in last night as they were unsure that she would make it through the night   This AM she is responsive and requesting to get up in chair for breakfast   sats are in the 80's  She does have increased cough  Her room mate as similar symptoms   There is one known positive case of influenza in the facility     REVIEW OF SYSTEMS:  Limited secondary to cognitive impairment but today pt reports \"feel ok\"    /58  Pulse 82  Temp 99.2  F (37.3  C)  Resp 19  Wt 128 lb 8 oz (58.3 kg)  SpO2 94%  BMI 20.74 kg/m2  GENERAL APPEARANCE:  Alert, in no distress  RESP:  respiratory effort and palpation of chest normal, auscultation of lungs clear t/o, no respiratory distress  CV:  Palpation and auscultation of heart done , rate and rhythm WNL, no murmur, no peripheral edema  ABDOMEN:  normal bowel sounds, soft, nontender, no hepatosplenomegaly or other masses  M/S:   Gait and station wheelchair bound, Digits and nails intact   SKIN:  Inspection and Palpation of skin and subcutaneous tissue   NEURO: 2-12 in normal limits and at patient's baseline  PSYCH:  insight and judgement, memory impaired , affect and mood normal    + influenza B  - influenza A    ASSESSMENT/PLAN:     Influenza B  Hospice care patient  Chronic bronchitis, unspecified chronic bronchitis type (H)     Patient " placed on droplet precautions   tamiflu treatment started  based on crCl (pharmacy dosing as they are dosing for entire facility)  Patient is comfortable  Hospice care involved and updated on status       Total time spent with patient visit at the skilled nursing facility was 25 min including patient visit and face to face visit with sons. Greater than 50% of total time spent with counseling and coordinating care due to impaired memory.     Electronically signed by:  Rajni Shah NP

## 2018-02-20 NOTE — MR AVS SNAPSHOT
"              After Visit Summary   2018    Benson Kapoor    MRN: [id number]           Patient Information     Date Of Birth          1925        Visit Information        Provider Department      2018 9:00 AM Rajni Shah NP [facility]        Today's Diagnoses     Influenza B    -  1    Hospice care patient        Chronic bronchitis, unspecified chronic bronchitis type (H)           Follow-ups after your visit        Who to contact     If you have questions or need follow up information about today's clinic visit or your schedule please contact [facility] directly at [phone].  Normal or non-critical lab and imaging results will be communicated to you by [employer]hart, letter or phone within 4 business days after the clinic has received the results. If you do not hear from us within 7 days, please contact the clinic through [employer]hart or phone. If you have a critical or abnormal lab result, we will notify you by phone as soon as possible.  Submit refill requests through [employer] or call your pharmacy and they will forward the refill request to us. Please allow 3 business days for your refill to be completed.          Additional Information About Your Visit        MyChart Information     [employer] lets you send messages to your doctor, view your test results, renew your prescriptions, schedule appointments and more. To sign up, go to [email or web address] . Click on \"Log in\" on the left side of the screen, which will take you to the Welcome page. Then click on \"Sign up Now\" on the right side of the page.     You will be asked to enter the access code listed below, as well as some personal information. Please follow the directions to create your username and password.     Your access code is: [email or web address]  Expires: 2018  2:09 PM     Your access code will  in 90 days. If you need help or a new code, please call your [facility] or [phone].        Care " EveryWhere ID     This is your Care EveryWhere ID. This could be used by other organizations to access your [place] medical records  [id number]        Your Vitals Were     Pulse Temperature Respirations Pulse Oximetry BMI (Body Mass Index)       82 99.2  F (37.3  C) 19 94% 20.74 kg/m2        Blood Pressure from Last 3 Encounters:   02/20/18 107/58   02/15/18 107/58   01/23/18 129/74    Weight from Last 3 Encounters:   02/20/18 128 lb 8 oz (58.3 kg)   02/15/18 128 lb 8 oz (58.3 kg)   01/23/18 124 lb 14.4 oz (56.7 kg)              Today, you had the following     No orders found for display       Primary Care Provider    VIC Deluna CNP       No address on file        Equal Access to Services     SCOTT FATIMA : Hadii aad ku hadasho Soomaali, waaxda luqadaha, qaybta kaalmada adeegyada, bernice leone . So [facility] [phone].    ATENCIÓN: Si habla español, tiene a isaac disposición servicios gratuitos de asistencia lingüística. Llame al [phone].    We comply with applicable federal civil rights laws and [place] laws. We do not discriminate on the basis of race, color, national origin, age, disability, sex, sexual orientation, or gender identity.            Thank you!     Thank you for choosing [facility]  for your care. Our goal is always to provide you with excellent care. Hearing back from our patients is one way we can continue to improve our services. Please take a few minutes to complete the written survey that you may receive in the mail after your visit with us. Thank you!             Your Updated Medication List - Protect others around you: Learn how to safely use, store and throw away your medicines at www.disposemymeds.org.          This list is accurate as of 2/20/18 11:59 PM.  Always use your most recent med list.                   Brand Name Dispense Instructions for use Diagnosis    albuterol (2.5 MG/3ML) 0.083% neb solution      Take 1 vial by  nebulization every 6 hours as needed        CALTRATE 600 PLUS-VIT D OR      Take 1 tablet by mouth 2 times daily.        DUONEB 0.5-2.5 (3) MG/3ML neb solution   Generic drug:  ipratropium - albuterol 0.5 mg/2.5 mg/3 mL      Take 1 vial by nebulization 4 times daily        MILK OF MAGNESIA PO      Take 30 mLs by mouth as needed.        morphine sulfate HIGH CONCENTRATE 20 mg/mL (HIGH CONC) solution    ROXANOL *CONCENTRATED*     Take 2.5 mg by mouth every 2 hours as needed for shortness of breath / dyspnea or moderate to severe pain        predniSONE 5 MG tablet    DELTASONE     Take 1 tablet (5 mg) by mouth daily        sennosides 8.6 MG tablet    SENOKOT     Take 1 tablet by mouth 2 times daily        TYLENOL EX ST ARTHRITIS PAIN 500 MG tablet   Generic drug:  acetaminophen      Take 650 mg by mouth 3 times daily And 650 mg every 4 hours prn        VITAMIN D (CHOLECALCIFEROL) PO      Take 600 Units by mouth daily

## 2018-03-01 ENCOUNTER — NURSING HOME VISIT (OUTPATIENT)
Dept: FAMILY MEDICINE | Facility: CLINIC | Age: 83
End: 2018-03-01
Payer: COMMERCIAL

## 2018-03-01 VITALS
WEIGHT: 128.5 LBS | BODY MASS INDEX: 20.74 KG/M2 | DIASTOLIC BLOOD PRESSURE: 76 MMHG | TEMPERATURE: 98.6 F | HEART RATE: 89 BPM | OXYGEN SATURATION: 93 % | SYSTOLIC BLOOD PRESSURE: 134 MMHG | RESPIRATION RATE: 20 BRPM

## 2018-03-01 DIAGNOSIS — J42 CHRONIC BRONCHITIS, UNSPECIFIED CHRONIC BRONCHITIS TYPE (H): ICD-10-CM

## 2018-03-01 DIAGNOSIS — Z51.5 HOSPICE CARE PATIENT: ICD-10-CM

## 2018-03-01 DIAGNOSIS — J10.1 INFLUENZA B: Primary | ICD-10-CM

## 2018-03-01 DIAGNOSIS — J45.30 MILD PERSISTENT ASTHMA WITHOUT COMPLICATION: ICD-10-CM

## 2018-03-01 PROCEDURE — 99309 SBSQ NF CARE MODERATE MDM 30: CPT | Mod: GW | Performed by: NURSE PRACTITIONER

## 2018-03-01 RX ORDER — BUDESONIDE 0.5 MG/2ML
0.5 INHALANT ORAL 2 TIMES DAILY
COMMUNITY
Start: 2018-02-10 | End: 2019-01-01

## 2018-03-29 ENCOUNTER — NURSING HOME VISIT (OUTPATIENT)
Dept: FAMILY MEDICINE | Facility: CLINIC | Age: 83
End: 2018-03-29
Payer: COMMERCIAL

## 2018-03-29 VITALS
RESPIRATION RATE: 24 BRPM | BODY MASS INDEX: 20.19 KG/M2 | SYSTOLIC BLOOD PRESSURE: 126 MMHG | DIASTOLIC BLOOD PRESSURE: 65 MMHG | HEART RATE: 78 BPM | WEIGHT: 125.1 LBS | OXYGEN SATURATION: 96 % | TEMPERATURE: 97.4 F

## 2018-03-29 DIAGNOSIS — F02.80 LATE ONSET ALZHEIMER'S DISEASE WITHOUT BEHAVIORAL DISTURBANCE (H): Primary | ICD-10-CM

## 2018-03-29 DIAGNOSIS — G30.1 LATE ONSET ALZHEIMER'S DISEASE WITHOUT BEHAVIORAL DISTURBANCE (H): Primary | ICD-10-CM

## 2018-03-29 DIAGNOSIS — Z51.5 HOSPICE CARE PATIENT: ICD-10-CM

## 2018-03-29 PROCEDURE — 99309 SBSQ NF CARE MODERATE MDM 30: CPT | Mod: GW | Performed by: NURSE PRACTITIONER

## 2018-03-29 NOTE — PROGRESS NOTES
[place] [facility]    Chief Complaint   Patient presents with     Nursing Home Acute       HPI:    Benson Kapoor is a 89 year old  (5/20/1925), who is being seen today for an episodic care visit at [facility].    HPI information obtained from: facility chart records, facility staff and patient report. Today's concern is:    Patients hospice case manager earlier in the week - who informed me that they have started scheduled lorazepam at [facility]  Patient was scared to go to sleep at night due to fear of dying   Hospice and nursing provided emotional support which had been ineffective   Lorazepam was started and this has been helpful       REVIEW OF SYSTEMS:  Unobtainable secondary to cognitive impairment.     /65  Pulse 78  Temp 97.4  F (36.3  C)  Resp 24  Wt 125 lb 1.6 oz (56.7 kg)  SpO2 96%  BMI 20.19 kg/m2  GENERAL APPEARANCE:  Alert, in no distress  RESP:  respiratory effort and palpation of chest normal, auscultation of lungs clear , no respiratory distress  CV:  Palpation and auscultation of heart done , rate and rhythm WNL, no murmur, no peripheral edema  ABDOMEN:  normal bowel sounds, soft, nontender, no hepatosplenomegaly or other masses  M/S:   Gait and station wheelchair bound, Digits and nails   PSYCH:  insight and judgement, memory impaired , affect and mood normal    ASSESSMENT/PLAN:     Late onset Alzheimer's disease without behavioral disturbance  Hospice care patient     Will continue lorazepam due to insomnia and fear of sleeping due to death  Patients son Lalo present in the room and is in agreement with her continuing on this   Focus is on comfort.      Orders:  1.  Continue Lorazepam 0.25mg PO at [facility] for 60 days .  Dx:  Agitation/insomnia    Total time spent with patient visit at the skilled nursing facility was 25 min including patient visit and review of past records. Greater than 50% of total time spent with counseling and coordinating care due to impaired  memory.    Electronically signed by:  Rajni Shah NP

## 2018-03-29 NOTE — MR AVS SNAPSHOT
"              After Visit Summary   3/29/2018    Benson Kapoor    MRN: [id number]           Patient Information     Date Of Birth          1925        Visit Information        Provider Department      3/29/2018 8:20 AM Rajni Shah NP [facility]        Today's Diagnoses     Late onset Alzheimer's disease without behavioral disturbance    -  1    Hospice care patient           Follow-ups after your visit        Who to contact     If you have questions or need follow up information about today's clinic visit or your schedule please contact [facility] directly at [phone].  Normal or non-critical lab and imaging results will be communicated to you by [employer], letter or phone within 4 business days after the clinic has received the results. If you do not hear from us within 7 days, please contact the clinic through [employer] or phone. If you have a critical or abnormal lab result, we will notify you by phone as soon as possible.  Submit refill requests through [employer] or call your pharmacy and they will forward the refill request to us. Please allow 3 business days for your refill to be completed.          Additional Information About Your Visit        MyChart Information     [employer] lets you send messages to your doctor, view your test results, renew your prescriptions, schedule appointments and more. To sign up, go to [email or web address] . Click on \"Log in\" on the left side of the screen, which will take you to the Welcome page. Then click on \"Sign up Now\" on the right side of the page.     You will be asked to enter the access code listed below, as well as some personal information. Please follow the directions to create your username and password.     Your access code is: [email or web address]  Expires: 2018  3:09 PM     Your access code will  in 90 days. If you need help or a new code, please call your [facility] or [phone].        Care EveryWhere ID     " This is your Care EveryWhere ID. This could be used by other organizations to access your [place] medical records  [id number]        Your Vitals Were     Pulse Temperature Respirations Pulse Oximetry BMI (Body Mass Index)       78 97.4  F (36.3  C) 24 96% 20.19 kg/m2        Blood Pressure from Last 3 Encounters:   03/29/18 126/65   03/01/18 134/76   02/20/18 107/58    Weight from Last 3 Encounters:   03/29/18 125 lb 1.6 oz (56.7 kg)   03/01/18 128 lb 8 oz (58.3 kg)   02/20/18 128 lb 8 oz (58.3 kg)              Today, you had the following     No orders found for display         Today's Medication Changes          These changes are accurate as of 3/29/18  4:01 PM.  If you have any questions, ask your nurse or doctor.               Stop taking these medicines if you haven't already. Please contact your care team if you have questions.     COMPAZINE PO                    Primary Care Provider    VIC Deluna CNP       No address on file        Equal Access to Services     [facility] : Hadii cristóbal ku hadasho Soomaali, waaxda luqadaha, qaybta kaalmada adeegyada, waxay jacinta leone . So [facility] [phone].    ATENCIÓN: Si habla español, tiene a isaac disposición servicios gratuitos de asistencia lingüística. Llame al [phone].    We comply with applicable federal civil rights laws and [place] laws. We do not discriminate on the basis of race, color, national origin, age, disability, sex, sexual orientation, or gender identity.            Thank you!     Thank you for choosing [facility]  for your care. Our goal is always to provide you with excellent care. Hearing back from our patients is one way we can continue to improve our services. Please take a few minutes to complete the written survey that you may receive in the mail after your visit with us. Thank you!             Your Updated Medication List - Protect others around you: Learn how to safely use, store and  throw away your medicines at www.disposemymeds.org.          This list is accurate as of 3/29/18  4:01 PM.  Always use your most recent med list.                   Brand Name Dispense Instructions for use Diagnosis    albuterol (2.5 MG/3ML) 0.083% neb solution      Take 1 vial by nebulization every 6 hours as needed        atropine 1 % Soln      Place 2 drops under the tongue every hour as needed for secretions        CALTRATE 600 PLUS-VIT D OR      Take 1 tablet by mouth 2 times daily.        DUONEB 0.5-2.5 (3) MG/3ML neb solution   Generic drug:  ipratropium - albuterol 0.5 mg/2.5 mg/3 mL      Take 1 vial by nebulization 4 times daily        LORAZEPAM PO      Take 0.25 mg by mouth At Bedtime        MAALOX REGULAR STRENGTH 200-200-20 MG/5ML Susp suspension   Generic drug:  alum & mag hydroxide-simethicone      Take 30 mLs by mouth every 2 hours as needed for indigestion        morphine sulfate HIGH CONCENTRATE 20 mg/mL (HIGH CONC) solution    ROXANOL *CONCENTRATED*     Take 2.5 mg by mouth every 2 hours as needed for shortness of breath / dyspnea or moderate to severe pain        predniSONE 5 MG tablet    DELTASONE     Take 1 tablet (5 mg) by mouth daily        sennosides 8.6 MG tablet    SENOKOT     Take 1 tablet by mouth 2 times daily        TYLENOL EX ST ARTHRITIS PAIN 500 MG tablet   Generic drug:  acetaminophen      Take 650 mg by mouth 3 times daily And 650 mg every 4 hours prn

## 2018-04-12 ENCOUNTER — TELEPHONE (OUTPATIENT)
Dept: FAMILY MEDICINE | Facility: CLINIC | Age: 83
End: 2018-04-12

## 2018-04-12 DIAGNOSIS — H91.93 BILATERAL HEARING LOSS, UNSPECIFIED HEARING LOSS TYPE: Primary | ICD-10-CM

## 2018-04-17 VITALS
SYSTOLIC BLOOD PRESSURE: 129 MMHG | RESPIRATION RATE: 20 BRPM | TEMPERATURE: 97.4 F | WEIGHT: 127.5 LBS | DIASTOLIC BLOOD PRESSURE: 77 MMHG | OXYGEN SATURATION: 96 % | HEART RATE: 79 BPM | BODY MASS INDEX: 20.58 KG/M2

## 2018-04-17 NOTE — PROGRESS NOTES
[facility]    Chief Complaint   Patient presents with     penitentiary Regulatory       HPI:    Benson Kapoor is a 89 year old  (5/20/1925), who is being seen today for a federally mandated E/M visit at [facility] .  HPI information obtained from: facility staff, patient report and [facility] chart review.     Today's concerns are:  - Resident seen and examined.   - RN reports enrolled in hospice in Jan after an episode of acute on chronic respiratory failure, and pna, graduated today from hospice.   - Reports sleep, appetite and BM are fine.   - RN / GNP has no concern today.   ----------------------------------------------  - - Past Medical, social, family histories, medications, and allergies reviewed and updated  - Medications reviewed: in the chart and EHR.   - Case Management:   I have reviewed the care plan and MDS and do agree with the plan. Patient's desire to return to the community is not present.  Information reviewed:  Medications, vital signs, orders, and nursing notes.    MEDICATIONS:  Current Outpatient Prescriptions   Medication Sig Dispense Refill     acetaminophen (TYLENOL EX ST ARTHRITIS PAIN) 500 MG tablet Take 650 mg by mouth 3 times daily And 650 mg every 4 hours prn       albuterol (2.5 MG/3ML) 0.083% neb solution Take 1 vial by nebulization every 6 hours as needed        alum & mag hydroxide-simethicone (MAALOX REGULAR STRENGTH) 200-200-20 MG/5ML SUSP suspension Take 30 mLs by mouth every 2 hours as needed for indigestion       atropine 1 % SOLN Place 2 drops under the tongue every hour as needed for secretions       Calcium-Vitamin D (CALTRATE 600 PLUS-VIT D OR) Take 1 tablet by mouth 2 times daily.       ipratropium - albuterol 0.5 mg/2.5 mg/3 mL (DUONEB) 0.5-2.5 (3) MG/3ML neb solution Take 1 vial by nebulization 4 times daily       LORAZEPAM PO Take 0.25 mg by mouth At Bedtime        morphine sulfate HIGH CONCENTRATE (ROXANOL *CONCENTRATED*) 20 mg/mL  (HIGH CONC) solution Take 2.5 mg by mouth every 2 hours as needed for shortness of breath / dyspnea or moderate to severe pain       Nutritional Supplements (NUTRITIONAL DRINK PO) 4oz BID       predniSONE (DELTASONE) 5 MG tablet Take 1 tablet (5 mg) by mouth daily       sennosides (SENOKOT) 8.6 MG tablet Take 1 tablet by mouth 2 times daily        Medications reviewed:  Medications started since last EPIC medication reconciliation:  Orders Placed This Encounter   Medications     Nutritional Supplements (NUTRITIONAL DRINK PO)     Sioz BID     ROS:  Limited secondary to cognitive impairment but today pt reports - see HPI    Exam:  Vitals: /77  Pulse 79  Temp 97.4  F (36.3  C)  Resp 20  Wt 127 lb 8 oz (57.8 kg)  SpO2 96%  BMI 20.58 kg/m2  BMI= Body mass index is 20.58 kg/(m^2).  GENERAL APPEARANCE: awake, non cooperative  ENT: dry oral mucosa, NC in place  RESP:  respiratory effort and palpation of chest normal, clear on ausculation.  no wheezing   CV:  Palpation and auscultation of heart done ,fast and  Irregular rate and rhythm, no murmur, rub, or gallop, no edema  ABDOMEN:  normal bowel sounds, soft, nontender, no hepatosplenomegaly or other masses  M/S:   Gait and station abnormal uses WC, self proper.   SKIN:  Mole 1x1 cm black over right cheek.   NEURO:   no NFD appreciated on observation  PSYCH:  anxious and somehow restless, but better    Lab/Diagnostic data: no new lab to review.     ASSESSMENT/PLAN  Chronic bronchitis, unspecified chronic bronchitis type (H)  Chronic respiratory failure, unspecified whether with hypoxia or hypercapnia (H)  Moderate Asthma, w/o complication,   - O2 dependant. Graduated today from the hospice.  - respiratory status has improved and back to baseline.   -will adjust meds. See below.     HTN (hypertension), benign:  BP Readings from Last 3 Encounters:   18 129/77   18 126/65   18 134/76   - controlled.       Hx of compression Fx of T12  Frail:  -  WC bound but self propel  - .analgesia optima  - Significant  Deficits requiring NH placement. Requiring extensive assistance from nursing. Keeps strolling the hallways back and forth    Orders:  1.  Beclomethasone 40mcg--1 puff BID for COPD  2.  DC Duoneb and morphine sulfate    Electronically signed by:  Leida Johnson MD

## 2018-04-18 ENCOUNTER — NURSING HOME VISIT (OUTPATIENT)
Dept: GERIATRICS | Facility: CLINIC | Age: 83
End: 2018-04-18
Payer: COMMERCIAL

## 2018-04-18 DIAGNOSIS — Z87.81 HISTORY OF COMPRESSION FRACTURE OF SPINE: ICD-10-CM

## 2018-04-18 DIAGNOSIS — J45.909 MODERATE ASTHMA WITHOUT COMPLICATION, UNSPECIFIED WHETHER PERSISTENT: ICD-10-CM

## 2018-04-18 DIAGNOSIS — I10 HTN (HYPERTENSION), BENIGN: ICD-10-CM

## 2018-04-18 DIAGNOSIS — J96.10 CHRONIC RESPIRATORY FAILURE, UNSPECIFIED WHETHER WITH HYPOXIA OR HYPERCAPNIA (H): ICD-10-CM

## 2018-04-18 DIAGNOSIS — R54 FRAIL ELDERLY: ICD-10-CM

## 2018-04-18 DIAGNOSIS — J42 CHRONIC BRONCHITIS, UNSPECIFIED CHRONIC BRONCHITIS TYPE (H): Primary | ICD-10-CM

## 2018-04-18 PROCEDURE — 99309 SBSQ NF CARE MODERATE MDM 30: CPT | Mod: GW | Performed by: FAMILY MEDICINE

## 2018-04-18 NOTE — LETTER
4/18/2018        RE: Benson Kapoor  [facility]  [address] [facility] [address]          [facility]    Chief Complaint   Patient presents with     [facility] Regulatory       HPI:    Benson Kapoor is a 89 year old  (5/20/1925), who is being seen today for a federally mandated E/M visit at [facility] .  HPI information obtained from: facility staff, patient report and [facility] chart review.     Today's concerns are:  - Resident seen and examined.   - RN reports enrolled in hospice in Jan after an episode of acute on chronic respiratory failure, and pna, graduated today from hospice.   - Reports sleep, appetite and BM are fine.   - RN / GNP has no concern today.   ----------------------------------------------  - - Past Medical, social, family histories, medications, and allergies reviewed and updated  - Medications reviewed: in the chart and EHR.   - Case Management:   I have reviewed the care plan and MDS and do agree with the plan. Patient's desire to return to the community is not present.  Information reviewed:  Medications, vital signs, orders, and nursing notes.    MEDICATIONS:  Current Outpatient Prescriptions   Medication Sig Dispense Refill     acetaminophen (TYLENOL EX ST ARTHRITIS PAIN) 500 MG tablet Take 650 mg by mouth 3 times daily And 650 mg every 4 hours prn       albuterol (2.5 MG/3ML) 0.083% neb solution Take 1 vial by nebulization every 6 hours as needed        alum & mag hydroxide-simethicone (MAALOX REGULAR STRENGTH) 200-200-20 MG/5ML SUSP suspension Take 30 mLs by mouth every 2 hours as needed for indigestion       atropine 1 % SOLN Place 2 drops under the tongue every hour as needed for secretions       Calcium-Vitamin D (CALTRATE 600 PLUS-VIT D OR) Take 1 tablet by mouth 2 times daily.       ipratropium - albuterol 0.5 mg/2.5 mg/3 mL (DUONEB) 0.5-2.5 (3) MG/3ML neb solution Take 1 vial by nebulization 4 times daily       LORAZEPAM  PO Take 0.25 mg by mouth At Bedtime        morphine sulfate HIGH CONCENTRATE (ROXANOL *CONCENTRATED*) 20 mg/mL (HIGH CONC) solution Take 2.5 mg by mouth every 2 hours as needed for shortness of breath / dyspnea or moderate to severe pain       Nutritional Supplements (NUTRITIONAL DRINK PO) 4oz BID       predniSONE (DELTASONE) 5 MG tablet Take 1 tablet (5 mg) by mouth daily       sennosides (SENOKOT) 8.6 MG tablet Take 1 tablet by mouth 2 times daily        Medications reviewed:  Medications started since last [facility] medication reconciliation:  Orders Placed This Encounter   Medications     Nutritional Supplements (NUTRITIONAL DRINK PO)     Sioz BID     ROS:  Limited secondary to cognitive impairment but today pt reports - see HPI    Exam:  Vitals: /77  Pulse 79  Temp 97.4  F (36.3  C)  Resp 20  Wt 127 lb 8 oz (57.8 kg)  SpO2 96%  BMI 20.58 kg/m2  BMI= Body mass index is 20.58 kg/(m^2).  GENERAL APPEARANCE: awake, non cooperative  ENT: dry oral mucosa, NC in place  RESP:  respiratory effort and palpation of chest normal, clear on ausculation.  no wheezing   CV:  Palpation and auscultation of heart done ,fast and  Irregular rate and rhythm, no murmur, rub, or gallop, no edema  ABDOMEN:  normal bowel sounds, soft, nontender, no hepatosplenomegaly or other masses  M/S:   Gait and station abnormal uses WC, self proper.   SKIN:  Mole 1x1 cm black over right cheek.   NEURO:   no NFD appreciated on observation  PSYCH:  anxious and somehow restless, but better    Lab/Diagnostic data: no new lab to review.     ASSESSMENT/PLAN  Chronic bronchitis, unspecified chronic bronchitis type (H)  Chronic respiratory failure, unspecified whether with hypoxia or hypercapnia (H)  Moderate Asthma, w/o complication,   - O2 dependant. Graduated today from the hospice.  - respiratory status has improved and back to baseline.   -will adjust meds. See below.     HTN (hypertension), benign:  BP Readings from Last 3 Encounters:    04/17/18 129/77   03/29/18 126/65   03/01/18 134/76   - controlled.       Hx of compression Fx of T12  Frail:  - WC bound but self propel  - .analgesia optima  - Significant  Deficits requiring NH placement. Requiring extensive assistance from nursing. Keeps strolling the hallways back and forth    Orders:  1.  Beclomethasone 40mcg--1 puff BID for COPD  2.  DC Duoneb and morphine sulfate    Electronically signed by:  Leida Johnson MD        Sincerely,        Leida Johnson MD

## 2018-05-03 ENCOUNTER — NURSING HOME VISIT (OUTPATIENT)
Dept: FAMILY MEDICINE | Facility: CLINIC | Age: 83
End: 2018-05-03
Payer: COMMERCIAL

## 2018-05-03 VITALS
WEIGHT: 130.1 LBS | DIASTOLIC BLOOD PRESSURE: 88 MMHG | HEIGHT: 66 IN | SYSTOLIC BLOOD PRESSURE: 136 MMHG | HEART RATE: 82 BPM | RESPIRATION RATE: 20 BRPM | TEMPERATURE: 98 F | BODY MASS INDEX: 20.91 KG/M2 | OXYGEN SATURATION: 97 %

## 2018-05-03 DIAGNOSIS — J42 CHRONIC BRONCHITIS, UNSPECIFIED CHRONIC BRONCHITIS TYPE (H): Primary | ICD-10-CM

## 2018-05-03 PROCEDURE — 99309 SBSQ NF CARE MODERATE MDM 30: CPT | Performed by: NURSE PRACTITIONER

## 2018-05-03 NOTE — PROGRESS NOTES
"[facility]    Chief Complaint   Patient presents with     RECHECK       HPI:    Benson Kapoor is a 89 year old  (5/20/1925), who is being seen today for an episodic care visit at [facility].    HPI information obtained from: facility chart records, facility staff, patient report and [facility] chart review. Today's concern is:  .     Asked by nursing to see patient due to complaints from patient and her family of increased shortness of breath   Nursing reports that patient frequently requests neb first thing in the morning   They report that the lorazepam that she takes at bedtime as prescribed by hospice seems to wear off quickly, middle of the night and patient will report labored breathing     She was on duo nebs four times a day which were d/diego at MD rounds    REVIEW OF SYSTEMS:  Unobtainable secondary to cognitive impairment.     /88  Pulse 82  Temp 98  F (36.7  C)  Resp 20  Ht 5' 6\" (1.676 m)  Wt 130 lb 1.6 oz (59 kg)  SpO2 97%  BMI 21 kg/m2  GENERAL APPEARANCE:  Alert, in no distress  GENERAL APPEARANCE:  Alert, in no distress  RESP:  respiratory effort and palpation of chest normal, auscultation of lungs scatted faint wheeze , no respiratory distress  CV:  Palpation and auscultation of heart done , rate and rhythm WNL, no murmur, no peripheral edema  ABDOMEN:  normal bowel sounds, soft, nontender, no hepatosplenomegaly or other masses  M/S:   Gait and station wheelchair bound, Digits and nails WNL  SKIN:  Inspection and Palpation of skin and subcutaneous tissue   NEURO: 2-12 in normal limits and at patient's baseline  PSYCH:  insight and judgement, memory impaired , affect and mood normal      ASSESSMENT/PLAN:  Chronic bronchitis, unspecified chronic bronchitis type (H)    Orders:  Duo nebs twice a day     Total time spent with patient visit at the skilled nursing facility was 25 min including patient visit and review of past records. Greater than 50% of total " time spent with counseling and coordinating care due to impaired memory    Electronically signed by:  Rajni Shah NP

## 2018-05-31 NOTE — PROGRESS NOTES
This patient's medication list and chart were reviewed as part of the service provided by [facility].    Assessment/Recommendations:  1. (COPD/asthma):  Per chart review, Qvar inhaler added back on regimen in April, but continued complaints of SOB.  Now on Duoneb bid scheduled.  Also on Prednisone 5mg qd.  Consider d'c of Prednisone as oral corticosteroids not recommended for long term use in COPD due to limited efficacy vs side effects per GOLD guidelines, and also consider d'c of Qvar due to likely difficult for patient to use properly & resulting in reduced efficacy.  May instead consider use of Budesonide neb bid in addition to already scheduled Duoneb bid.  Please also consider d'c of prn Albuterol nebs and instead could use Duoneb prn in addition to scheduled, not more than 6 times daily total.  Room to increase Duoneb to qid scheduled in future if necessary.  Lorazepam likely started by hospice, and may now benefit from d'c; pt graduated from hospice, and med may contribute to confusion, sedation, falls/fractures.  May also consider d'c of prn Atropine gtts due to high anticholinergic activity.  2. (Supplements):  Consider d'c of calcium/vit D combo since pt no longer ambulatory, calcium may contribuet to constipation/gi upset.  May continue with plain vitamin D3 1000u daily alone for weakness/fall/fracture prevention, pt likely low in vitamin D without supplementation.      Carmen Ibrahim, Pharm.D.,[facility]  Board Certified Geriatric Pharmacist  Medication Therapy Management Pharmacist  [phone]

## 2018-06-21 NOTE — LETTER
6/21/2018        RE: Benson Kapoor  [facility]  [address] [facility] [address]          [facility]    Chief Complaint   Patient presents with     longterm Regulatory       [place] Medical Record Number:  [id number]    HPI:    Benson Kapoor is a 89 year old  (5/20/1925), who is being seen today for a federally mandated E/M visit at [facility].  HPI information obtained from: facility chart records, facility staff, patient report and [facility] chart review. Today's concerns are:      Patient and nursing deny any concerns   She denies pain   Breathing is stable   Reviewed vitals and progress noted in PCC no concerns   No longer getting hospice services due to not meeting criteria   Overall is doing well     Wt Readings from Last 10 Encounters:   06/21/18 137 lb 14.4 oz (62.6 kg)   05/03/18 130 lb 1.6 oz (59 kg)   04/17/18 127 lb 8 oz (57.8 kg)   03/29/18 125 lb 1.6 oz (56.7 kg)   03/01/18 128 lb 8 oz (58.3 kg)   02/20/18 128 lb 8 oz (58.3 kg)   02/15/18 128 lb 8 oz (58.3 kg)   01/23/18 124 lb 14.4 oz (56.7 kg)   01/18/18 124 lb 14.4 oz (56.7 kg)   01/17/18 130 lb 6.4 oz (59.1 kg)         ALLERGIES: Review of patient's allergies indicates no known allergies.  PAST MEDICAL HISTORY:  has a past medical history of A fib; Anemia due to blood loss, acute (9/5/2012); DJD (degenerative joint disease); and HTN.  PAST SURGICAL HISTORY:  has a past surgical history that includes surgical history of -  and hysterectomy, cervix status unknown.  FAMILY HISTORY: family history is not on file.  SOCIAL HISTORY:  reports that she has never smoked. She has never used smokeless tobacco. She reports that she does not drink alcohol or use illicit drugs.    MEDICATIONS:  Current Outpatient Prescriptions   Medication Sig Dispense Refill     acetaminophen (TYLENOL EX ST ARTHRITIS PAIN) 500 MG tablet Take 650 mg by mouth 3 times daily And 650 mg every 4 hours prn       albuterol  "(2.5 MG/3ML) 0.083% neb solution Take 1 vial by nebulization every 6 hours as needed        alum & mag hydroxide-simethicone (MAALOX REGULAR STRENGTH) 200-200-20 MG/5ML SUSP suspension Take 30 mLs by mouth every 2 hours as needed for indigestion       atropine 1 % SOLN Place 2 drops under the tongue every hour as needed for secretions       cholecalciferol (VITAMIN  -D) 1000 units capsule Take 1 capsule by mouth daily       ipratropium - albuterol 0.5 mg/2.5 mg/3 mL (DUONEB) 0.5-2.5 (3) MG/3ML neb solution Take 1 vial (3 mLs) by nebulization 2 times daily 90 vial 1     LORAZEPAM PO Take 0.25 mg by mouth At Bedtime        Nutritional Supplements (NUTRITIONAL DRINK PO) 4oz BID       predniSONE (DELTASONE) 5 MG tablet Take 1 tablet (5 mg) by mouth daily       sennosides (SENOKOT) 8.6 MG tablet Take 1 tablet by mouth 2 times daily        Medications reviewed:  Medications reconciled to facility chart and changes were made to reflect current medications as identified as above med list. Below are the changes that were made:   Medications stopped since last EPIC medication reconciliation:   There are no discontinued medications.    Medications started since last [facility] medication reconciliation:  No orders of the defined types were placed in this encounter.      Case Management:  I have reviewed the care plan and MDS and do agree with the plan. Patient's desire to return to the community is not present.  Information reviewed:  Medications, vital signs, orders, and nursing notes.    ROS:  Unobtainable secondary to cognitive impairment.     Exam:  Vitals: /87  Pulse 76  Temp 97  F (36.1  C)  Resp 22  Ht 5' 6\" (1.676 m)  Wt 137 lb 14.4 oz (62.6 kg)  SpO2 97%  BMI 22.26 kg/m2  BMI= Body mass index is 22.26 kg/(m^2).  GENERAL APPEARANCE:  Alert, in no distress  RESP:  respiratory effort and palpation of chest normal, lungs clear to auscultation , no respiratory distress  CV:  Palpation and auscultation of heart done " , regular rate and rhythm, no murmur, rub, or gallop  ABDOMEN:  normal bowel sounds, soft, nontender, no hepatosplenomegaly or other masses  M/S:   Gait and station abnormal wheelchair bound  Digits and nails normal  SKIN:  Inspection of skin and subcutaneous tissue baseline, Palpation of skin and subcutaneous tissue baseline  NEURO:   Cranial nerves 2-12 are normal tested and grossly at patient's baseline  PSYCH:  memory impaired , affect and mood normal    Lab/Diagnostic data:     CBC RESULTS:   Recent Labs   Lab Test  01/18/18   0600  11/22/17   0755   WBC  11.5*  4.7   RBC  3.93  4.05   HGB  12.2  12.7   HCT  38.8  39.1   MCV  99  97   MCH  31.0  31.4   MCHC  31.4*  32.5   RDW  13.1  14.1   PLT  253  136*       Last Basic Metabolic Panel:  Recent Labs   Lab Test  11/22/17   0755  10/12/17   0600   NA  140  138   POTASSIUM  4.0  4.3   CHLORIDE  99  103   OG  8.6  8.8   CO2  34*  30   BUN  13  12   CR  0.43*  0.48*   GLC  85  84       Liver Function Studies -   Recent Labs   Lab Test  03/22/12   0920  12/26/11   1135   PROTTOTAL  7.0  7.6   ALBUMIN  3.6  4.0   BILITOTAL  0.4  0.5   ALKPHOS  78  76   AST  27  29   ALT  9  12       No results found for: TSH]    No results found for: A1C    ASSESSMENT/PLAN  Late onset Alzheimer's disease without behavioral disturbance  Stable  No concerns with behaviors     Frail elderly  Wheelchair bound  Requires extensive assistance with transferring and ADLs from nursing     Chronic bronchitis, unspecified chronic bronchitis type (H)  O2 dependent  Stable   No concerns with exacerbations   She has done well on prednisone since started by hospice will resume as goal is comfort    HTN (hypertension), benign  stable    Based on JNC-8 goals,  patients age of 89 year old, no presence of diabetes or CKD, and goals of care goal BP is <150/90 mm Hg. Patient is stable and continue without pharmacological invention with routine assessment..      Orders:  1.  DC calcium/Vit D  supplement  2.  Start Vit D3 1000unit daily    Total time spent with patient visit at the skilled nursing facility was 25 min including patient visit. Greater than 50% of total time spent with counseling and coordinating care due to impaired memory    Electronically signed by:  Rajni Shah NP        Sincerely,        Rajni Shah NP

## 2018-06-21 NOTE — PROGRESS NOTES
[facility]    Chief Complaint   Patient presents with     MCFP Regulatory       [facility] Medical Record Number:  [id number]    HPI:    Benson Kapoor is a 89 year old  (5/20/1925), who is being seen today for a federally mandated E/M visit at [facility].  HPI information obtained from: facility chart records, facility staff, patient report and [facility] Epic chart review. Today's concerns are:      Patient and nursing deny any concerns   She denies pain   Breathing is stable   Reviewed vitals and progress noted in PCC no concerns   No longer getting hospice services due to not meeting criteria   Overall is doing well     Wt Readings from Last 10 Encounters:   06/21/18 137 lb 14.4 oz (62.6 kg)   05/03/18 130 lb 1.6 oz (59 kg)   04/17/18 127 lb 8 oz (57.8 kg)   03/29/18 125 lb 1.6 oz (56.7 kg)   03/01/18 128 lb 8 oz (58.3 kg)   02/20/18 128 lb 8 oz (58.3 kg)   02/15/18 128 lb 8 oz (58.3 kg)   01/23/18 124 lb 14.4 oz (56.7 kg)   01/18/18 124 lb 14.4 oz (56.7 kg)   01/17/18 130 lb 6.4 oz (59.1 kg)         ALLERGIES: Review of patient's allergies indicates no known allergies.  PAST MEDICAL HISTORY:  has a past medical history of A fib; Anemia due to blood loss, acute (9/5/2012); DJD (degenerative joint disease); and HTN.  PAST SURGICAL HISTORY:  has a past surgical history that includes surgical history of -  and hysterectomy, cervix status unknown.  FAMILY HISTORY: family history is not on file.  SOCIAL HISTORY:  reports that she has never smoked. She has never used smokeless tobacco. She reports that she does not drink alcohol or use illicit drugs.    MEDICATIONS:  Current Outpatient Prescriptions   Medication Sig Dispense Refill     acetaminophen (TYLENOL EX ST ARTHRITIS PAIN) 500 MG tablet Take 650 mg by mouth 3 times daily And 650 mg every 4 hours prn       albuterol (2.5 MG/3ML) 0.083% neb solution Take 1 vial by nebulization every 6 hours as needed        alum & mag  "hydroxide-simethicone (MAALOX REGULAR STRENGTH) 200-200-20 MG/5ML SUSP suspension Take 30 mLs by mouth every 2 hours as needed for indigestion       atropine 1 % SOLN Place 2 drops under the tongue every hour as needed for secretions       cholecalciferol (VITAMIN  -D) 1000 units capsule Take 1 capsule by mouth daily       ipratropium - albuterol 0.5 mg/2.5 mg/3 mL (DUONEB) 0.5-2.5 (3) MG/3ML neb solution Take 1 vial (3 mLs) by nebulization 2 times daily 90 vial 1     LORAZEPAM PO Take 0.25 mg by mouth At Bedtime        Nutritional Supplements (NUTRITIONAL DRINK PO) 4oz BID       predniSONE (DELTASONE) 5 MG tablet Take 1 tablet (5 mg) by mouth daily       sennosides (SENOKOT) 8.6 MG tablet Take 1 tablet by mouth 2 times daily        Medications reviewed:  Medications reconciled to facility chart and changes were made to reflect current medications as identified as above med list. Below are the changes that were made:   Medications stopped since last EPIC medication reconciliation:   There are no discontinued medications.    Medications started since last [facility] medication reconciliation:  No orders of the defined types were placed in this encounter.      Case Management:  I have reviewed the care plan and MDS and do agree with the plan. Patient's desire to return to the community is not present.  Information reviewed:  Medications, vital signs, orders, and nursing notes.    ROS:  Unobtainable secondary to cognitive impairment.     Exam:  Vitals: /87  Pulse 76  Temp 97  F (36.1  C)  Resp 22  Ht 5' 6\" (1.676 m)  Wt 137 lb 14.4 oz (62.6 kg)  SpO2 97%  BMI 22.26 kg/m2  BMI= Body mass index is 22.26 kg/(m^2).  GENERAL APPEARANCE:  Alert, in no distress  RESP:  respiratory effort and palpation of chest normal, lungs clear to auscultation , no respiratory distress  CV:  Palpation and auscultation of heart done , regular rate and rhythm, no murmur, rub, or gallop  ABDOMEN:  normal bowel sounds, soft, nontender, " no hepatosplenomegaly or other masses  M/S:   Gait and station abnormal wheelchair bound  Digits and nails normal  SKIN:  Inspection of skin and subcutaneous tissue baseline, Palpation of skin and subcutaneous tissue baseline  NEURO:   Cranial nerves 2-12 are normal tested and grossly at patient's baseline  PSYCH:  memory impaired , affect and mood normal    Lab/Diagnostic data:     CBC RESULTS:   Recent Labs   Lab Test  01/18/18   0600  11/22/17   0755   WBC  11.5*  4.7   RBC  3.93  4.05   HGB  12.2  12.7   HCT  38.8  39.1   MCV  99  97   MCH  31.0  31.4   MCHC  31.4*  32.5   RDW  13.1  14.1   PLT  253  136*       Last Basic Metabolic Panel:  Recent Labs   Lab Test  11/22/17   0755  10/12/17   0600   NA  140  138   POTASSIUM  4.0  4.3   CHLORIDE  99  103   OG  8.6  8.8   CO2  34*  30   BUN  13  12   CR  0.43*  0.48*   GLC  85  84       Liver Function Studies -   Recent Labs   Lab Test  03/22/12   0920  12/26/11   1135   PROTTOTAL  7.0  7.6   ALBUMIN  3.6  4.0   BILITOTAL  0.4  0.5   ALKPHOS  78  76   AST  27  29   ALT  9  12       No results found for: TSH]    No results found for: A1C    ASSESSMENT/PLAN  Late onset Alzheimer's disease without behavioral disturbance  Stable  No concerns with behaviors     Frail elderly  Wheelchair bound  Requires extensive assistance with transferring and ADLs from nursing     Chronic bronchitis, unspecified chronic bronchitis type (H)  O2 dependent  Stable   No concerns with exacerbations   She has done well on prednisone since started by hospice will resume as goal is comfort    HTN (hypertension), benign  stable    Based on JNC-8 goals,  patients age of 89 year old, no presence of diabetes or CKD, and goals of care goal BP is <150/90 mm Hg. Patient is stable and continue without pharmacological invention with routine assessment..      Orders:  1.  DC calcium/Vit D supplement  2.  Start Vit D3 1000unit daily    Total time spent with patient visit at the skilled nursing facility  was 25 min including patient visit. Greater than 50% of total time spent with counseling and coordinating care due to impaired memory    Electronically signed by:  Rajni Shah NP

## 2018-07-17 NOTE — PROGRESS NOTES
[facility]    Chief Complaint   Patient presents with     Nursing Home Acute       HPI:    Benson Kapoor is a 89 year old  (5/20/1925), who is being seen today for an episodic care visit at [facility].    HPI information obtained from: facility chart records, facility staff and patient report. Today's concern is:  .   Nursing reports that patient has been complaining of difficulty sleeping. She had been on lorazepam as prescribed by hospice this was d/c'ed  She is requesting something to help with sleep    she reports that she cannot shut her mind off, she denies shortness of breath   She has a hard both falling asleep and staying asleep     REVIEW OF SYSTEMS:  Limited secondary to cognitive impairment but today pt reports trouble sleeping     /71  Pulse 84  Temp 98.7  F (37.1  C)  Resp 18  Wt 135 lb 1.6 oz (61.3 kg)  SpO2 97%  BMI 21.81 kg/m2  GENERAL APPEARANCE:  Alert, in no distress  RESP:  respiratory effort and palpation of chest normal, auscultation of lungs clear , no respiratory distress  CV:  Palpation and auscultation of heart done , rate and rhythm WNL, no murmur, no peripheral edema  ABDOMEN:  normal bowel sounds, soft, nontender, no hepatosplenomegaly or other masses  PSYCH:  insight and judgement, memory impaired , affect and mood normal      ASSESSMENT/PLAN:     Late onset Alzheimer's disease without behavioral disturbance  Primary insomnia  Chronic bronchitis, unspecified chronic bronchitis type (H)     Orders:  1.  Trazodone 50mg at HS for insomnia      Atropine discontinued as not using.     Total time spent with patient visit at the skilled nursing facility was 25 min including patient visit and review of past records. Greater than 50% of total time spent with counseling and coordinating care due to impaired cognition.    Electronically signed by:  Rajni Shah NP

## 2018-07-17 NOTE — LETTER
7/17/2018        RE: Benson Kapoor  [facility]  [address] [facility] [address]        [facility]    Chief Complaint   Patient presents with     Nursing Home Acute       HPI:    Benson Kapoor is a 89 year old  (5/20/1925), who is being seen today for an episodic care visit at [facility].    HPI information obtained from: facility chart records, facility staff and patient report. Today's concern is:  .   Nursing reports that patient has been complaining of difficulty sleeping. She had been on lorazepam as prescribed by hospice this was d/c'ed  She is requesting something to help with sleep    she reports that she cannot shut her mind off, she denies shortness of breath   She has a hard both falling asleep and staying asleep     REVIEW OF SYSTEMS:  Limited secondary to cognitive impairment but today pt reports trouble sleeping     /71  Pulse 84  Temp 98.7  F (37.1  C)  Resp 18  Wt 135 lb 1.6 oz (61.3 kg)  SpO2 97%  BMI 21.81 kg/m2  GENERAL APPEARANCE:  Alert, in no distress  RESP:  respiratory effort and palpation of chest normal, auscultation of lungs clear , no respiratory distress  CV:  Palpation and auscultation of heart done , rate and rhythm WNL, no murmur, no peripheral edema  ABDOMEN:  normal bowel sounds, soft, nontender, no hepatosplenomegaly or other masses  PSYCH:  insight and judgement, memory impaired , affect and mood normal      ASSESSMENT/PLAN:     Late onset Alzheimer's disease without behavioral disturbance  Primary insomnia  Chronic bronchitis, unspecified chronic bronchitis type (H)     Orders:  1.  Trazodone 50mg at HS for insomnia      Atropine discontinued as not using.     Total time spent with patient visit at the skilled nursing facility was 25 min including patient visit and review of past records. Greater than 50% of total time spent with counseling and coordinating care due to impaired cognition.    Electronically signed  by:  Rajni Shah NP      Sincerely,        Rajni Shah NP

## 2018-08-14 NOTE — PROGRESS NOTES
"  [facility]    Chief Complaint   Patient presents with     halfway Regulatory       [place] [facility] Medical Record Number:  [id number]    HPI:    Benson Kapoor is a 89 year old  (5/20/1925), who is being seen today for a federally mandated E/M visit at [facility] .  HPI information obtained from: facility staff, patient report, [facility] chart review and NP.     Today's concerns are:  -  - Resident seen and examined.   - Reports doing same, endorsed coughing some times with plain phlegm.   - Denies wheezing, fever, chills or chest pain  - Reports sleep, appetite and BM are fine.   - RN has no concern today.   - GNP reports :\"COPD- was started on daily prednisone by hospice in place of ICS- has done very well on this. Trouble sleeping added in trazodone- effective per nursing was on lorazepam previously   HTN and Alzheimer s- stable   --------------------------------  - - Past Medical, social, family histories, medications, and allergies reviewed and updated  - Medications reviewed: in the chart and EHR.   - Case Management:   I have reviewed the care plan and MDS and do agree with the plan. Patient's desire to return to the community is not present.  Information reviewed:  Medications, vital signs, orders, and nursing notes.    MEDICATIONS:  Current Outpatient Prescriptions   Medication Sig Dispense Refill     acetaminophen (TYLENOL EX ST ARTHRITIS PAIN) 500 MG tablet Take 650 mg by mouth 3 times daily And 650 mg every 4 hours prn       albuterol (2.5 MG/3ML) 0.083% neb solution Take 1 vial by nebulization every 6 hours as needed        alum & mag hydroxide-simethicone (MAALOX REGULAR STRENGTH) 200-200-20 MG/5ML SUSP suspension Take 30 mLs by mouth every 2 hours as needed for indigestion       cholecalciferol (VITAMIN  -D) 1000 units capsule Take 1 capsule by mouth daily       ipratropium - albuterol 0.5 mg/2.5 mg/3 mL (DUONEB) 0.5-2.5 (3) MG/3ML neb solution Take 1 vial (3 mLs) by " nebulization 2 times daily 90 vial 1     Nutritional Supplements (NUTRITIONAL DRINK PO) 4oz BID       predniSONE (DELTASONE) 5 MG tablet Take 1 tablet (5 mg) by mouth daily       sennosides (SENOKOT) 8.6 MG tablet Take 1 tablet by mouth 2 times daily        TRAZODONE HCL PO Take 50 mg by mouth At Bedtime       ROS:  Limited secondary to cognitive impairment but today pt reports - see HPI    Exam:  Vitals: /69  Pulse 90  Temp 97.8  F (36.6  C)  Resp 18  Wt 103 lb 14.4 oz (47.1 kg)  SpO2 97%  BMI 16.77 kg/m2  BMI= Body mass index is 16.77 kg/(m^2).  GENERAL APPEARANCE: awake, non cooperative  ENT: dry oral mucosa, NC in place  RESP:  respiratory effort and palpation of chest normal, diffuse diminished BS, some crackles at the bases.   CV:  Palpation and auscultation of heart done ,fast and  Irregular rate and rhythm, no murmur, rub, or gallop, no edema  ABDOMEN:  normal bowel sounds, soft, nontender, no hepatosplenomegaly or other masses  M/S:   Gait and station abnormal uses WC, self proper.   SKIN: inspection and palpation at baseline.   NEURO:   no NFD appreciated on observation  PSYCH: mood and affect fine.     Lab/Diagnostic data: none new.     ASSESSMENT/PLAN  Chronic respiratory failure, unspecified whether with hypoxia or hypercapnia (H)  Moderate Asthma, w/o complication,   - O2 dependant. Graduated today from the hospice.  - respiratory status has improved and back to baseline.   -will adjust meds. See below.      HTN (hypertension), benign:  -off meds, comfortable.     Hx of compression Fx of T12  Frail:  - WC bound but self propel  - .analgesia optimal  - Significant  Deficits requiring NH placement. Requiring extensive assistance from nursing. Keeps strolling the hallways back and forth    Hospice:  - Symptoms managed by  GNP and Hospice Team.    Orders:  - See above, otherwise, continue the rest of the current POC.       Electronically signed by:  Leida Johnson MD

## 2018-08-15 NOTE — LETTER
"    8/15/2018        RE: Benson Kapoor  [facility]  [address] [facility] [address]          [facility]    Chief Complaint   Patient presents with     long-term Regulatory       [place] Medical Record Number:  [id number]    HPI:    Benson Kapoor is a 89 year old  (5/20/1925), who is being seen today for a federally mandated E/M visit at [facility] .  HPI information obtained from: facility staff, patient report, [facility] chart review and NP.     Today's concerns are:  -  - Resident seen and examined.   - Reports doing same, endorsed coughing some times with plain phlegm.   - Denies wheezing, fever, chills or chest pain  - Reports sleep, appetite and BM are fine.   - RN has no concern today.   - GNP reports :\"COPD- was started on daily prednisone by hospice in place of ICS- has done very well on this. Trouble sleeping added in trazodone- effective per nursing was on lorazepam previously   HTN and Alzheimer s- stable   --------------------------------  - - Past Medical, social, family histories, medications, and allergies reviewed and updated  - Medications reviewed: in the chart and EHR.   - Case Management:   I have reviewed the care plan and MDS and do agree with the plan. Patient's desire to return to the community is not present.  Information reviewed:  Medications, vital signs, orders, and nursing notes.    MEDICATIONS:  Current Outpatient Prescriptions   Medication Sig Dispense Refill     acetaminophen (TYLENOL EX ST ARTHRITIS PAIN) 500 MG tablet Take 650 mg by mouth 3 times daily And 650 mg every 4 hours prn       albuterol (2.5 MG/3ML) 0.083% neb solution Take 1 vial by nebulization every 6 hours as needed        alum & mag hydroxide-simethicone (MAALOX REGULAR STRENGTH) 200-200-20 MG/5ML SUSP suspension Take 30 mLs by mouth every 2 hours as needed for indigestion       cholecalciferol (VITAMIN  -D) 1000 units capsule Take 1 capsule by mouth daily  "      ipratropium - albuterol 0.5 mg/2.5 mg/3 mL (DUONEB) 0.5-2.5 (3) MG/3ML neb solution Take 1 vial (3 mLs) by nebulization 2 times daily 90 vial 1     Nutritional Supplements (NUTRITIONAL DRINK PO) 4oz BID       predniSONE (DELTASONE) 5 MG tablet Take 1 tablet (5 mg) by mouth daily       sennosides (SENOKOT) 8.6 MG tablet Take 1 tablet by mouth 2 times daily        TRAZODONE HCL PO Take 50 mg by mouth At Bedtime       ROS:  Limited secondary to cognitive impairment but today pt reports - see HPI    Exam:  Vitals: /69  Pulse 90  Temp 97.8  F (36.6  C)  Resp 18  Wt 103 lb 14.4 oz (47.1 kg)  SpO2 97%  BMI 16.77 kg/m2  BMI= Body mass index is 16.77 kg/(m^2).  GENERAL APPEARANCE: awake, non cooperative  ENT: dry oral mucosa, NC in place  RESP:  respiratory effort and palpation of chest normal, diffuse diminished BS, some crackles at the bases.   CV:  Palpation and auscultation of heart done ,fast and  Irregular rate and rhythm, no murmur, rub, or gallop, no edema  ABDOMEN:  normal bowel sounds, soft, nontender, no hepatosplenomegaly or other masses  M/S:   Gait and station abnormal uses WC, self proper.   SKIN: inspection and palpation at baseline.   NEURO:   no NFD appreciated on observation  PSYCH: mood and affect fine.     Lab/Diagnostic data: none new.     ASSESSMENT/PLAN  Chronic respiratory failure, unspecified whether with hypoxia or hypercapnia (H)  Moderate Asthma, w/o complication,   - O2 dependant. Graduated today from the hospice.  - respiratory status has improved and back to baseline.   -will adjust meds. See below.      HTN (hypertension), benign:  -off meds, comfortable.     Hx of compression Fx of T12  Frail:  - WC bound but self propel  - .analgesia optimal  - Significant  Deficits requiring NH placement. Requiring extensive assistance from nursing. Keeps strolling the hallways back and forth    Hospice:  - Symptoms managed by  GNP and Hospice Team.    Orders:  - See above, otherwise,  continue the rest of the current POC.       Electronically signed by:  Leida Johnson MD        Sincerely,        Leida Johnson MD

## 2018-09-27 NOTE — PROGRESS NOTES
[facility]    Chief Complaint   Patient presents with     retirement Acute       [place] Medical Record Number:  [id number]    HPI:    Benson Kapoor is a 89 year old  (5/20/1925), who is being seen today for an episodic care visit at [facility].   HPI information obtained from: facility chart records, facility staff, patient report and family/first contact son report. Today's concern is:    I was called by son to come into room as he states patient was having a hard time breathing   Patient reports that in the past 24 hours she has feel short of breath but much worse in the past few minutes   Noted left arm to be edematous     Noted elevated in weight   Wt Readings from Last 10 Encounters:   09/27/18 160 lb (72.6 kg)   08/14/18 103 lb 14.4 oz (47.1 kg)- doubt accuracy with this weight    07/17/18 135 lb 1.6 oz (61.3 kg)   06/21/18 137 lb 14.4 oz (62.6 kg)   05/03/18 130 lb 1.6 oz (59 kg)   04/17/18 127 lb 8 oz (57.8 kg)   03/29/18 125 lb 1.6 oz (56.7 kg)   03/01/18 128 lb 8 oz (58.3 kg)   02/20/18 128 lb 8 oz (58.3 kg)   02/15/18 128 lb 8 oz (58.3 kg)     No fever      REVIEW OF SYSTEMS:  4 point ROS including Respiratory, CV, GI and , other than that noted in the HPI,  is negative    /86  Pulse 75  Temp 99.1  F (37.3  C)  Resp 24  Wt 160 lb (72.6 kg)  SpO2 93%  BMI 25.82 kg/m2  GENERAL APPEARANCE:  Alert, in mild distress  RESP:  dyspneic and auscultation of lungs crackles in bilateral bases   CV:  Palpation and auscultation of heart done , rate and rhythm WNL, no murmur, no peripheral edema  ABDOMEN:  normal bowel sounds, soft, nontender, no hepatosplenomegaly or other masses  M/S:   Gait and station wheelchair bound, Digits and nails   SKIN:  Inspection and Palpation of skin and subcutaneous tissue   NEURO: 2-12 in normal limits and at patient's baseline  PSYCH:  insight and judgement, memory impaired , affect and mood normal      ASSESSMENT/PLAN:     Acute  systolic heart failure (H)  Chronic bronchitis, unspecified chronic bronchitis type (H)     With increase in respiratory status, weight increase   Suspect acute CHF   Will treat with lasix and replace potassium for 3 days   She was given a neb while I was there with improve in symptoms     POLST: with no further hospitalization ,was on hospice but graduated   Will follow up on Tuesday for a recheck     Orders:  1.  Lasix 40mg daily x3 days  2.  Potassium 40meq daily x3 days    Total time spent with patient visit at the skilled nursing facility was 25 min including patient visit and review of past records. Greater than 50% of total time spent with counseling and coordinating care due to inpaired cognition.     Electronically signed by:  Rajni Shah NP

## 2018-09-27 NOTE — LETTER
9/27/2018        RE: Benson Kapoor  [facility]  [address] [facility] [address]        [place] [facility]    Chief Complaint   Patient presents with     MCFP Acute       [place] Medical Record Number:  [id number]    HPI:    Benson Kapoor is a 89 year old  (5/20/1925), who is being seen today for an episodic care visit at [facility].   HPI information obtained from: facility chart records, facility staff, patient report and family/first contact son report. Today's concern is:    I was called by son to come into room as he states patient was having a hard time breathing   Patient reports that in the past 24 hours she has feel short of breath but much worse in the past few minutes   Noted left arm to be edematous     Noted elevated in weight   Wt Readings from Last 10 Encounters:   09/27/18 160 lb (72.6 kg)   08/14/18 103 lb 14.4 oz (47.1 kg)- doubt accuracy with this weight    07/17/18 135 lb 1.6 oz (61.3 kg)   06/21/18 137 lb 14.4 oz (62.6 kg)   05/03/18 130 lb 1.6 oz (59 kg)   04/17/18 127 lb 8 oz (57.8 kg)   03/29/18 125 lb 1.6 oz (56.7 kg)   03/01/18 128 lb 8 oz (58.3 kg)   02/20/18 128 lb 8 oz (58.3 kg)   02/15/18 128 lb 8 oz (58.3 kg)     No fever      REVIEW OF SYSTEMS:  4 point ROS including Respiratory, CV, GI and , other than that noted in the HPI,  is negative    /86  Pulse 75  Temp 99.1  F (37.3  C)  Resp 24  Wt 160 lb (72.6 kg)  SpO2 93%  BMI 25.82 kg/m2  GENERAL APPEARANCE:  Alert, in mild distress  RESP:  dyspneic and auscultation of lungs crackles in bilateral bases   CV:  Palpation and auscultation of heart done , rate and rhythm WNL, no murmur, no peripheral edema  ABDOMEN:  normal bowel sounds, soft, nontender, no hepatosplenomegaly or other masses  M/S:   Gait and station wheelchair bound, Digits and nails   SKIN:  Inspection and Palpation of skin and subcutaneous tissue   NEURO: 2-12 in normal limits and at patient's  baseline  PSYCH:  insight and judgement, memory impaired , affect and mood normal      ASSESSMENT/PLAN:     Acute systolic heart failure (H)  Chronic bronchitis, unspecified chronic bronchitis type (H)     With increase in respiratory status, weight increase   Suspect acute CHF   Will treat with lasix and replace potassium for 3 days   She was given a neb while I was there with improve in symptoms     POLST: with no further hospitalization ,was on hospice but graduated   Will follow up on Tuesday for a recheck     Orders:  1.  Lasix 40mg daily x3 days  2.  Potassium 40meq daily x3 days    Total time spent with patient visit at the skilled nursing facility was 25 min including patient visit and review of past records. Greater than 50% of total time spent with counseling and coordinating care due to inpaired cognition.     Electronically signed by:  Rajni Shah NP      Sincerely,        Rajni Shah NP

## 2018-10-01 NOTE — PROGRESS NOTES
"[place] [facility]    Chief Complaint   Patient presents with     USP Acute       [place] Medical Record Number:  [id number]    HPI:    Benson Kapoor is a 89 year old  (5/20/1925), who is being seen today for an episodic care visit at [facility].   HPI information obtained from: facility chart records, facility staff and patient report. Today's concern is:    Patient seen for follow up for CHF exacerbation   She was prescribed lasix for 3 days with potassium replacement on Thursday   She reports that she is breathing much easier  She was participating at activities prior to my visit     Weight is down to baseline:   Wt Readings from Last 2 Encounters:   10/01/18 145 lb 6.4 oz (66 kg)   09/27/18 160 lb (72.6 kg)       REVIEW OF SYSTEMS:  4 point ROS including Respiratory, CV, GI and , other than that noted in the HPI,  is negative    /73  Pulse 67  Temp 98  F (36.7  C)  Resp 23  Ht 5' 6\" (1.676 m)  Wt 145 lb 6.4 oz (66 kg)  SpO2 98%  BMI 23.47 kg/m2  GENERAL APPEARANCE:  Alert, in no distress  RESP:  respiratory effort and palpation of chest normal, auscultation of lungs clear , no respiratory distress  CV:  Palpation and auscultation of heart done , rate and rhythm WNL, no murmur, no peripheral edema  ABDOMEN:  normal bowel sounds, soft, nontender, no hepatosplenomegaly or other masses    ASSESSMENT/PLAN:     Acute systolic heart failure (H)  Late onset Alzheimer's disease without behavioral disturbance     Improved with 3 day of lasix     Orders:  No new orders      Total time spent with patient visit at the skilled nursing facility was 15 min including patient visit and review of past records. Greater than 50% of total time spent with counseling and coordinating care due to impaired memory.    Electronically signed by:  Rajni Shah NP  "

## 2018-10-02 NOTE — LETTER
"    10/2/2018        RE: Benson Kapoor    [address] [facility] [address]        [place] [facility]    Chief Complaint   Patient presents with     intermediate Acute       [place] Medical Record Number:  [id number]    HPI:    Benson Kapoor is a 89 year old  (5/20/1925), who is being seen today for an episodic care visit at [facility].   HPI information obtained from: facility chart records, facility staff and patient report. Today's concern is:    Patient seen for follow up for CHF exacerbation   She was prescribed lasix for 3 days with potassium replacement on Thursday   She reports that she is breathing much easier  She was participating at activities prior to my visit     Weight is down to baseline:   Wt Readings from Last 2 Encounters:   10/01/18 145 lb 6.4 oz (66 kg)   09/27/18 160 lb (72.6 kg)       REVIEW OF SYSTEMS:  4 point ROS including Respiratory, CV, GI and , other than that noted in the HPI,  is negative    /73  Pulse 67  Temp 98  F (36.7  C)  Resp 23  Ht 5' 6\" (1.676 m)  Wt 145 lb 6.4 oz (66 kg)  SpO2 98%  BMI 23.47 kg/m2  GENERAL APPEARANCE:  Alert, in no distress  RESP:  respiratory effort and palpation of chest normal, auscultation of lungs clear , no respiratory distress  CV:  Palpation and auscultation of heart done , rate and rhythm WNL, no murmur, no peripheral edema  ABDOMEN:  normal bowel sounds, soft, nontender, no hepatosplenomegaly or other masses    ASSESSMENT/PLAN:     Acute systolic heart failure (H)  Late onset Alzheimer's disease without behavioral disturbance     Improved with 3 day of lasix     Orders:  No new orders      Total time spent with patient visit at the skilled nursing facility was 15 min including patient visit and review of past records. Greater than 50% of total time spent with counseling and coordinating care due to impaired memory.    Electronically signed by:  Rajni Shah, " NP      Sincerely,        Rajni Shah NP

## 2018-10-02 NOTE — MR AVS SNAPSHOT
"              After Visit Summary   10/2/2018    Benson Kapoor    MRN: [id number]           Patient Information     Date Of Birth          5/20/1925        Visit Information        Provider Department      10/2/2018 8:40 AM Rajni Shah NP [facility]        Today's Diagnoses     Acute systolic heart failure (H)    -  1    Late onset Alzheimer's disease without behavioral disturbance           Follow-ups after your visit        Who to contact     If you have questions or need follow up information about today's clinic visit or your schedule please contact [facility] directly at [phone].  Normal or non-critical lab and imaging results will be communicated to you by MyChart, letter or phone within 4 business days after the clinic has received the results. If you do not hear from us within 7 days, please contact the clinic through MyChart or phone. If you have a critical or abnormal lab result, we will notify you by phone as soon as possible.  Submit refill requests through [employer] or call your pharmacy and they will forward the refill request to us. Please allow 3 business days for your refill to be completed.          Additional Information About Your Visit        Care EveryWhere ID     This is your Care EveryWhere ID. This could be used by other organizations to access your [facility] medical records  [id number]        Your Vitals Were     Pulse Temperature Respirations Height Pulse Oximetry BMI (Body Mass Index)    67 98  F (36.7  C) 23 5' 6\" (1.676 m) 98% 23.47 kg/m2       Blood Pressure from Last 3 Encounters:   10/01/18 150/73   09/27/18 159/86   08/14/18 127/69    Weight from Last 3 Encounters:   10/01/18 145 lb 6.4 oz (66 kg)   09/27/18 160 lb (72.6 kg)   08/14/18 103 lb 14.4 oz (47.1 kg)              Today, you had the following     No orders found for display       Primary Care Provider    VIC Deluna CNP       No address on file        Equal Access to " Services     [facility]: Hadii cristóbal bowen vivienne Jasmineali, waaxda luqadaha, qaybta kaalmada adejarredyamil, bernice leone . So [facility] [phone].    ATENCIÓN: Si miahla trini, tiene a isaac disposición servicios gratuitos de asistencia lingüística. Llame al [phone].    We comply with applicable federal civil rights laws and [place] laws. We do not discriminate on the basis of race, color, national origin, age, disability, sex, sexual orientation, or gender identity.            Thank you!     Thank you for choosing [facility]  for your care. Our goal is always to provide you with excellent care. Hearing back from our patients is one way we can continue to improve our services. Please take a few minutes to complete the written survey that you may receive in the mail after your visit with us. Thank you!             Your Updated Medication List - Protect others around you: Learn how to safely use, store and throw away your medicines at www.disposemymeds.org.          This list is accurate as of 10/2/18 11:59 PM.  Always use your most recent med list.                   Brand Name Dispense Instructions for use Diagnosis    albuterol (2.5 MG/3ML) 0.083% neb solution      Take 1 vial by nebulization every 6 hours as needed        cholecalciferol 1000 units capsule    vitamin  -D     Take 1 capsule by mouth daily        ipratropium - albuterol 0.5 mg/2.5 mg/3 mL 0.5-2.5 (3) MG/3ML neb solution    DUONEB    90 vial    Take 1 vial (3 mLs) by nebulization 2 times daily        MAALOX REGULAR STRENGTH 200-200-20 MG/5ML Susp suspension   Generic drug:  alum & mag hydroxide-simethicone      Take 30 mLs by mouth every 2 hours as needed for indigestion        NUTRITIONAL DRINK PO      4oz BID        predniSONE 5 MG tablet    DELTASONE     Take 1 tablet (5 mg) by mouth daily        sennosides 8.6 MG tablet    SENOKOT     Take 1 tablet by mouth 2 times daily        TRAZODONE HCL PO      Take  50 mg by mouth At Bedtime        TYLENOL EX ST ARTHRITIS PAIN 500 MG tablet   Generic drug:  acetaminophen      Take 650 mg by mouth 3 times daily And 650 mg every 4 hours prn

## 2018-10-08 NOTE — PROGRESS NOTES
[place] [facility]    Chief Complaint   Patient presents with     prison Regulatory       [place] Medical Record Number:  [id number]  Place of Service where encounter took place:  [facility] ([facility]) [[id number]]    HPI:    Benson aKpoor is a 89 year old  (5/20/1925), who is being seen today for a federally mandated E/M visit.  HPI information obtained from: facility chart records, facility staff and patient report. Today's concerns are:    Patient has increased edema in left arm and hand and bilateral legs   She has increased shortness of breath and cough   This has developed over the weekend but worsened last night and today   She was treated for a CHF exacerbation on 9/27 improved with lasix for 3 days     Weight is increased   Wt Readings from Last 10 Encounters:   10/08/18 153 lb 4.8 oz (69.5 kg)   10/01/18 145 lb 6.4 oz (66 kg)   09/27/18 160 lb (72.6 kg)   08/14/18 103 lb 14.4 oz (47.1 kg)   07/17/18 135 lb 1.6 oz (61.3 kg)   06/21/18 137 lb 14.4 oz (62.6 kg)   05/03/18 130 lb 1.6 oz (59 kg)   04/17/18 127 lb 8 oz (57.8 kg)   03/29/18 125 lb 1.6 oz (56.7 kg)   03/01/18 128 lb 8 oz (58.3 kg)     No other concerns expressed     ALLERGIES: Review of patient's allergies indicates no known allergies.  PAST MEDICAL HISTORY:  has a past medical history of A fib; Anemia due to blood loss, acute (9/5/2012); DJD (degenerative joint disease); and HTN.  PAST SURGICAL HISTORY:  has a past surgical history that includes surgical history of -  and hysterectomy, cervix status unknown.  FAMILY HISTORY: family history is not on file.  SOCIAL HISTORY:  reports that she has never smoked. She has never used smokeless tobacco. She reports that she does not drink alcohol or use illicit drugs.    MEDICATIONS:  Current Outpatient Prescriptions   Medication Sig Dispense Refill     acetaminophen (TYLENOL EX ST ARTHRITIS PAIN) 500 MG tablet Take 650 mg by mouth 3 times daily And 650 mg every 4 hours prn        "albuterol (2.5 MG/3ML) 0.083% neb solution Take 1 vial by nebulization every 6 hours as needed        alum & mag hydroxide-simethicone (MAALOX REGULAR STRENGTH) 200-200-20 MG/5ML SUSP suspension Take 30 mLs by mouth every 2 hours as needed for indigestion       cholecalciferol (VITAMIN  -D) 1000 units capsule Take 1 capsule by mouth daily       ipratropium - albuterol 0.5 mg/2.5 mg/3 mL (DUONEB) 0.5-2.5 (3) MG/3ML neb solution Take 1 vial (3 mLs) by nebulization 2 times daily 90 vial 1     Nutritional Supplements (NUTRITIONAL DRINK PO) 4oz BID       predniSONE (DELTASONE) 5 MG tablet Take 1 tablet (5 mg) by mouth daily       sennosides (SENOKOT) 8.6 MG tablet Take 1 tablet by mouth 2 times daily        TRAZODONE HCL PO Take 50 mg by mouth At Bedtime       Medications reviewed:  Medications reconciled to facility chart and changes were made to reflect current medications as identified as above med list. Below are the changes that were made:   Medications stopped since last EPIC medication reconciliation:   There are no discontinued medications.    Medications started since last [facility] medication reconciliation:  No orders of the defined types were placed in this encounter.        Case Management:  I have reviewed the care plan and MDS and do agree with the plan. Patient's desire to return to the community is present, but is not able due to care needs .  Information reviewed:  Medications, vital signs, orders, and nursing notes.    ROS:  Limited secondary to cognitive impairment but today pt reports swelling in hand \"I have to wear my watch on the otherside\"  Shortness of breath     Exam:  Vitals: /64  Pulse 73  Temp 98.2  F (36.8  C)  Resp 19  Ht 5' 6\" (1.676 m)  Wt 153 lb 4.8 oz (69.5 kg)  SpO2 96%  BMI 24.74 kg/m2  BMI= Body mass index is 24.74 kg/(m^2).  GENERAL APPEARANCE:  Alert, in no distress  NECK:  No adenopathy,masses or thyromegaly  RESP:  crackles bilateral bases  CV:  Palpation and " auscultation of heart done , regular rate and rhythm, no murmur, rub, or gallop  Non pitting edema in bilateral ankles  ABDOMEN:  normal bowel sounds, soft, nontender, no hepatosplenomegaly or other masses  M/S:   Gait and station abnormal wheelchair bound  SKIN:  Inspection of skin and subcutaneous tissue baseline, Palpation of skin and subcutaneous tissue baseline  NEURO:   Cranial nerves 2-12 are normal tested and grossly at patient's baseline  PSYCH:  memory impaired , affect and mood normal    Lab/Diagnostic data:     CBC RESULTS:   Recent Labs   Lab Test  01/18/18   0600  11/22/17   0755   WBC  11.5*  4.7   RBC  3.93  4.05   HGB  12.2  12.7   HCT  38.8  39.1   MCV  99  97   MCH  31.0  31.4   MCHC  31.4*  32.5   RDW  13.1  14.1   PLT  253  136*       Last Basic Metabolic Panel:  Recent Labs   Lab Test  09/20/18   0624  11/22/17   0755   NA  137  140   POTASSIUM  4.0  4.0   CHLORIDE  98  99   OG  8.3*  8.6   CO2  36*  34*   BUN  15  13   CR  0.56  0.43*   GLC  76  85       Liver Function Studies -   Recent Labs   Lab Test  03/22/12   0920  12/26/11   1135   PROTTOTAL  7.0  7.6   ALBUMIN  3.6  4.0   BILITOTAL  0.4  0.5   ALKPHOS  78  76   AST  27  29   ALT  9  12       No results found for: TSH]    No results found for: A1C    ASSESSMENT/PLAN  Acute systolic heart failure (H)  New onset of symptoms within the past month   Will treat with 3 days of lasix at 40 mg and then start 20 mg daily   Replaced potassium   Will check echocardiogram to eval for heart failure- may qualify for hospice services pending on results    Late onset Alzheimer's disease without behavioral disturbance  No behaviors other than some anxiety at bedtime improved with trazodone     Chronic bronchitis, unspecified chronic bronchitis type (H)  O2 dependent  Will increase duo nebs to four times a day   She is on chronic prednisone   She is oxygen dependent    Primary insomnia  On trazodone   improved    Frail elderly  Wheelchair bound   Had  been on hospice but no longer met criteria for services         Orders:  1.  Lasix 40mg daily for 3days and then 20mg daily  2.  Potassium 20meq daily  3.  Daily weights  4.  BMP, CBC  5.  duonebs QID  6.  Echocardiogram through [facility]    Total time spent with patient visit at the skilled nursing facility was 35 min including patient visit, review of past records and phone call to patient contact. Greater than 50% of total time spent with counseling and coordinating care due to impaired cognition    Electronically signed by:  Rajni Shah NP

## 2018-10-09 NOTE — LETTER
10/9/2018        RE: Benson Kapoor  [facility]  [address] [facility] [address]          [place] [facility]    Chief Complaint   Patient presents with     penitentiary Regulatory       [place] Medical Record Number:  [id number]  [facility][id number]    HPI:    Benson Kapoor is a 89 year old  (5/20/1925), who is being seen today for a federally mandated E/M visit.  HPI information obtained from: facility chart records, facility staff and patient report. Today's concerns are:    Patient has increased edema in left arm and hand and bilateral legs   She has increased shortness of breath and cough   This has developed over the weekend but worsened last night and today   She was treated for a CHF exacerbation on 9/27 improved with lasix for 3 days     Weight is increased   Wt Readings from Last 10 Encounters:   10/08/18 153 lb 4.8 oz (69.5 kg)   10/01/18 145 lb 6.4 oz (66 kg)   09/27/18 160 lb (72.6 kg)   08/14/18 103 lb 14.4 oz (47.1 kg)   07/17/18 135 lb 1.6 oz (61.3 kg)   06/21/18 137 lb 14.4 oz (62.6 kg)   05/03/18 130 lb 1.6 oz (59 kg)   04/17/18 127 lb 8 oz (57.8 kg)   03/29/18 125 lb 1.6 oz (56.7 kg)   03/01/18 128 lb 8 oz (58.3 kg)     No other concerns expressed     ALLERGIES: Review of patient's allergies indicates no known allergies.  PAST MEDICAL HISTORY:  has a past medical history of A fib; Anemia due to blood loss, acute (9/5/2012); DJD (degenerative joint disease); and HTN.  PAST SURGICAL HISTORY:  has a past surgical history that includes surgical history of -  and hysterectomy, cervix status unknown.  FAMILY HISTORY: family history is not on file.  SOCIAL HISTORY:  reports that she has never smoked. She has never used smokeless tobacco. She reports that she does not drink alcohol or use illicit drugs.    MEDICATIONS:  Current Outpatient Prescriptions   Medication Sig Dispense Refill     acetaminophen  "(TYLENOL EX ST ARTHRITIS PAIN) 500 MG tablet Take 650 mg by mouth 3 times daily And 650 mg every 4 hours prn       albuterol (2.5 MG/3ML) 0.083% neb solution Take 1 vial by nebulization every 6 hours as needed        alum & mag hydroxide-simethicone (MAALOX REGULAR STRENGTH) 200-200-20 MG/5ML SUSP suspension Take 30 mLs by mouth every 2 hours as needed for indigestion       cholecalciferol (VITAMIN  -D) 1000 units capsule Take 1 capsule by mouth daily       ipratropium - albuterol 0.5 mg/2.5 mg/3 mL (DUONEB) 0.5-2.5 (3) MG/3ML neb solution Take 1 vial (3 mLs) by nebulization 2 times daily 90 vial 1     Nutritional Supplements (NUTRITIONAL DRINK PO) 4oz BID       predniSONE (DELTASONE) 5 MG tablet Take 1 tablet (5 mg) by mouth daily       sennosides (SENOKOT) 8.6 MG tablet Take 1 tablet by mouth 2 times daily        TRAZODONE HCL PO Take 50 mg by mouth At Bedtime       Medications reviewed:  Medications reconciled to facility chart and changes were made to reflect current medications as identified as above med list. Below are the changes that were made:   Medications stopped since last EPIC medication reconciliation:   There are no discontinued medications.    Medications started since last [facility] medication reconciliation:  No orders of the defined types were placed in this encounter.        Case Management:  I have reviewed the care plan and MDS and do agree with the plan. Patient's desire to return to the community is present, but is not able due to care needs .  Information reviewed:  Medications, vital signs, orders, and nursing notes.    ROS:  Limited secondary to cognitive impairment but today pt reports swelling in hand \"I have to wear my watch on the otherside\"  Shortness of breath     Exam:  Vitals: /64  Pulse 73  Temp 98.2  F (36.8  C)  Resp 19  Ht 5' 6\" (1.676 m)  Wt 153 lb 4.8 oz (69.5 kg)  SpO2 96%  BMI 24.74 kg/m2  BMI= Body mass index is 24.74 kg/(m^2).  GENERAL APPEARANCE:  Alert, in no " distress  NECK:  No adenopathy,masses or thyromegaly  RESP:  crackles bilateral bases  CV:  Palpation and auscultation of heart done , regular rate and rhythm, no murmur, rub, or gallop  Non pitting edema in bilateral ankles  ABDOMEN:  normal bowel sounds, soft, nontender, no hepatosplenomegaly or other masses  M/S:   Gait and station abnormal wheelchair bound  SKIN:  Inspection of skin and subcutaneous tissue baseline, Palpation of skin and subcutaneous tissue baseline  NEURO:   Cranial nerves 2-12 are normal tested and grossly at patient's baseline  PSYCH:  memory impaired , affect and mood normal    Lab/Diagnostic data:     CBC RESULTS:   Recent Labs   Lab Test  01/18/18   0600  11/22/17   0755   WBC  11.5*  4.7   RBC  3.93  4.05   HGB  12.2  12.7   HCT  38.8  39.1   MCV  99  97   MCH  31.0  31.4   MCHC  31.4*  32.5   RDW  13.1  14.1   PLT  253  136*       Last Basic Metabolic Panel:  Recent Labs   Lab Test  09/20/18   0624  11/22/17   0755   NA  137  140   POTASSIUM  4.0  4.0   CHLORIDE  98  99   OG  8.3*  8.6   CO2  36*  34*   BUN  15  13   CR  0.56  0.43*   GLC  76  85       Liver Function Studies -   Recent Labs   Lab Test  03/22/12   0920  12/26/11   1135   PROTTOTAL  7.0  7.6   ALBUMIN  3.6  4.0   BILITOTAL  0.4  0.5   ALKPHOS  78  76   AST  27  29   ALT  9  12       No results found for: TSH]    No results found for: A1C    ASSESSMENT/PLAN  Acute systolic heart failure (H)  New onset of symptoms within the past month   Will treat with 3 days of lasix at 40 mg and then start 20 mg daily   Replaced potassium   Will check echocardiogram to eval for heart failure- may qualify for hospice services pending on results    Late onset Alzheimer's disease without behavioral disturbance  No behaviors other than some anxiety at bedtime improved with trazodone     Chronic bronchitis, unspecified chronic bronchitis type (H)  O2 dependent  Will increase duo nebs to four times a day   She is on chronic prednisone   She is  oxygen dependent    Primary insomnia  On trazodone   improved    Frail elderly  Wheelchair bound   Had been on hospice but no longer met criteria for services         Orders:  1.  Lasix 40mg daily for 3days and then 20mg daily  2.  Potassium 20meq daily  3.  Daily weights  4.  BMP, CBC  5.  duonebs QID  6.  Echocardiogram through [facility]    Total time spent with patient visit at the skilled nursing facility was 35 min including patient visit, review of past records and phone call to patient contact. Greater than 50% of total time spent with counseling and coordinating care due to impaired cognition    Electronically signed by:  Rajni Shah NP        Sincerely,        Rajni Shah NP

## 2018-10-16 NOTE — PROGRESS NOTES
"[place] [facility]    Chief Complaint   Patient presents with     senior care Acute       [place] Medical Record Number:  [id number]  Place of Service where encounter took place:  [facility] ([facility]) [[id number]]    HPI:    Benson Kapoor is a 89 year old  (5/20/1925), who is being seen today for an episodic care visit.  HPI information obtained from: facility chart records, facility staff and patient report. Today's concern is:    Followed up for recheck on CHF exacerbation from last week   Echo results pending- nursing has contacted [facility]  Swelling in left arm and bilateral lower extremities  is improved   Breathing is improved   Weight is down   Now on daily lasix   Wt Readings from Last 10 Encounters:   10/16/18 150 lb (68 kg)   10/08/18 153 lb 4.8 oz (69.5 kg)   10/01/18 145 lb 6.4 oz (66 kg)   09/27/18 160 lb (72.6 kg)   08/14/18 103 lb 14.4 oz (47.1 kg)   07/17/18 135 lb 1.6 oz (61.3 kg)   06/21/18 137 lb 14.4 oz (62.6 kg)   05/03/18 130 lb 1.6 oz (59 kg)   04/17/18 127 lb 8 oz (57.8 kg)   03/29/18 125 lb 1.6 oz (56.7 kg)         REVIEW OF SYSTEMS:  4 point ROS including Respiratory, CV, GI and , other than that noted in the HPI,  is negative    /69  Pulse 62  Temp 98.4  F (36.9  C)  Resp 18  Ht 5' 6\" (1.676 m)  Wt 150 lb (68 kg)  SpO2 98%  BMI 24.21 kg/m2  GENERAL APPEARANCE:  Alert, in no distress  RESP:  respiratory effort and palpation of chest normal, auscultation of lungs clear , no respiratory distress  CV:  Palpation and auscultation of heart done , rate and rhythm WNL, no murmur, trace peripheral edema, trace edema in left arm  ABDOMEN:  normal bowel sounds, soft, nontender, no hepatosplenomegaly or other masses  M/S:   Gait and station wheelchair bound, Digits and nails WNL  SKIN:  Inspection and Palpation of skin and subcutaneous tissue WNL  NEURO: 2-12 in normal limits and at patient's baseline  PSYCH:  insight and judgement, memory impaired , affect and mood " normal      ASSESSMENT/PLAN:  Acute systolic heart failure (H)  Improved  Now on daily lasix and potassium   Awaiting echo results  Hospice referral (patient was recently d/c'ed as no longer meeting criteria)    Late onset Alzheimer's disease without behavioral disturbance  Requires extensive assistance with ADl's   Son present at time of visit and plan of care was discussed       Total time spent with patient visit at the [facility] nursing [facility] was 25 min including patient visit, review of past records and face to face visit with son. Greater than 50% of total time spent with counseling and coordinating care due to impaired cognition    Electronically signed by:  Rajni Shah NP

## 2018-10-16 NOTE — LETTER
"    10/16/2018        RE: Benson Kapoor  [facility]  [address] [facility] [address]        [place] [facility]    Chief Complaint   Patient presents with     residential Acute       [facility] Medical Record Number:  [id number]  Place of Service where encounter took place:  [facility] ([facility]) [[id number]]    HPI:    Benson Kapoor is a 89 year old  (5/20/1925), who is being seen today for an episodic care visit.  HPI information obtained from: facility chart records, facility staff and patient report. Today's concern is:    Followed up for recheck on CHF exacerbation from last week   Echo results pending- nursing has contacted PPX  Swelling in left arm and bilateral lower extremities  is improved   Breathing is improved   Weight is down   Now on daily lasix   Wt Readings from Last 10 Encounters:   10/16/18 150 lb (68 kg)   10/08/18 153 lb 4.8 oz (69.5 kg)   10/01/18 145 lb 6.4 oz (66 kg)   09/27/18 160 lb (72.6 kg)   08/14/18 103 lb 14.4 oz (47.1 kg)   07/17/18 135 lb 1.6 oz (61.3 kg)   06/21/18 137 lb 14.4 oz (62.6 kg)   05/03/18 130 lb 1.6 oz (59 kg)   04/17/18 127 lb 8 oz (57.8 kg)   03/29/18 125 lb 1.6 oz (56.7 kg)         REVIEW OF SYSTEMS:  4 point ROS including Respiratory, CV, GI and , other than that noted in the HPI,  is negative    /69  Pulse 62  Temp 98.4  F (36.9  C)  Resp 18  Ht 5' 6\" (1.676 m)  Wt 150 lb (68 kg)  SpO2 98%  BMI 24.21 kg/m2  GENERAL APPEARANCE:  Alert, in no distress  RESP:  respiratory effort and palpation of chest normal, auscultation of lungs clear , no respiratory distress  CV:  Palpation and auscultation of heart done , rate and rhythm WNL, no murmur, trace peripheral edema, trace edema in left arm  ABDOMEN:  normal bowel sounds, soft, nontender, no hepatosplenomegaly or other masses  M/S:   Gait and station wheelchair bound, Digits and nails WNL  SKIN:  Inspection and Palpation of skin and subcutaneous tissue WNL  NEURO: 2-12 in " normal limits and at patient's baseline  PSYCH:  insight and judgement, memory impaired , affect and mood normal      ASSESSMENT/PLAN:  Acute systolic heart failure (H)  Improved  Now on daily lasix and potassium   Awaiting echo results  Hospice referral (patient was recently d/c'ed as no longer meeting criteria)    Late onset Alzheimer's disease without behavioral disturbance  Requires extensive assistance with ADl's   Son present at time of visit and plan of care was discussed       Total time spent with patient visit at the skilled nursing facility was 25 min including patient visit, review of past records and face to face visit with son. Greater than 50% of total time spent with counseling and coordinating care due to impaired cognition    Electronically signed by:  Rajni Shah NP      Sincerely,        Rajni Shah NP

## 2018-10-22 NOTE — PROGRESS NOTES
[place] [facility]    Chief Complaint   Patient presents with     retirement Acute       [place] Medical Record Number:  [id number]  Place of Service where encounter took place:  [facility] ([facility]) [[id number]]    HPI:    Benson Kapoor is a 89 year old  (5/20/1925), who is being seen today for an episodic care visit.  HPI information obtained from: facility chart records, facility staff, patient report and family/first contact son report. Today's concern is:  Met with patient and her son  Son has requesting something to help with worsening moods and anxiety especially at bedtime   Patient frequently calls out and wants staff with her at all time   She tells me that she is fearful to be alone, not sure why   She feels lonely despite have frequent visit from sons   She feels depressed   Denies thoughts of self harm     REVIEW OF SYSTEMS:  Limited secondary to cognitive impairment but today pt reports mood changes as above    /78  Pulse 89  Temp 98.2  F (36.8  C)  Resp 22  Wt 139 lb 8 oz (63.3 kg)  SpO2 95%  BMI 22.52 kg/m2  GENERAL APPEARANCE:  Alert, in no distress      ASSESSMENT/PLAN:     Late onset Alzheimer's disease without behavioral disturbance  YINA (generalized anxiety disorder)     Patient and son are interested in something that may help her moods   She was on cymbalta up until Danny  Her moods have worsened since then   She had been on lorazepam at bedtime as started by hospice    Now on trazadone   Will add in serotonin specific reuptake inhibitor- lexapro and monitor closely     Orders:  1.  lexapro 5mg daily    Total time spent with patient visit at the skilled nursing facility was 25 min including patient visit and review of past records. Greater than 50% of total time spent with counseling and coordinating care due to cognitive impairments    Electronically signed by:  Rajni Shah NP

## 2018-10-23 NOTE — LETTER
10/23/2018        RE: Benson Kapoor  [facility]  [address] [facility] [address]        [place] [facility]    Chief Complaint   Patient presents with     group home Acute       [place] Medical Record Number:  [id number]  Place of Service where encounter took place:  [facility] ([facility]) [[id number]]    HPI:    Benson Kapoor is a 89 year old  (5/20/1925), who is being seen today for an episodic care visit.  HPI information obtained from: facility chart records, facility staff, patient report and family/first contact son report. Today's concern is:  Met with patient and her son  Son has requesting something to help with worsening moods and anxiety especially at bedtime   Patient frequently calls out and wants staff with her at all time   She tells me that she is fearful to be alone, not sure why   She feels lonely despite have frequent visit from sons   She feels depressed   Denies thoughts of self harm     REVIEW OF SYSTEMS:  Limited secondary to cognitive impairment but today pt reports mood changes as above    /78  Pulse 89  Temp 98.2  F (36.8  C)  Resp 22  Wt 139 lb 8 oz (63.3 kg)  SpO2 95%  BMI 22.52 kg/m2  GENERAL APPEARANCE:  Alert, in no distress      ASSESSMENT/PLAN:     Late onset Alzheimer's disease without behavioral disturbance  YINA (generalized anxiety disorder)     Patient and son are interested in something that may help her moods   She was on cymbalta up until Jan  Her moods have worsened since then   She had been on lorazepam at bedtime as started by hospice    Now on trazadone   Will add in serotonin specific reuptake inhibitor- lexapro and monitor closely     Orders:  1.  lexapro 5mg daily    Total time spent with patient visit at the skilled nursing facility was 25 min including patient visit and review of past records. Greater than 50% of total time spent with counseling and coordinating care due to cognitive impairments    Electronically  signed by:  Rajni Shah NP      Sincerely,        Rajni Shah NP

## 2018-11-08 PROBLEM — I48.0 INTERMITTENT ATRIAL FIBRILLATION (H): Status: ACTIVE | Noted: 2018-01-01

## 2018-11-08 PROBLEM — I27.20 PULMONARY HYPERTENSION (H): Status: ACTIVE | Noted: 2018-01-01

## 2018-11-08 PROBLEM — Z99.81 O2 DEPENDENT: Status: ACTIVE | Noted: 2018-01-01

## 2018-11-08 NOTE — MR AVS SNAPSHOT
"              After Visit Summary   11/8/2018    Benson Kapoor    MRN: [id number]           Patient Information     Date Of Birth          5/20/1925        Visit Information        Provider Department      11/8/2018 9:00 AM Rajni Shah NP [facility]        Today's Diagnoses     Chronic bronchitis, unspecified chronic bronchitis type (H)    -  1    O2 dependent        Pulmonary hypertension (H)        Peripheral edema        Intermittent atrial fibrillation (H)        HTN (hypertension), benign        YINA (generalized anxiety disorder)        Primary insomnia        Bilateral impacted cerumen           Follow-ups after your visit        Who to contact     If you have questions or need follow up information about today's clinic visit or your schedule please contact [facility] directly at [phone].  Normal or non-critical lab and imaging results will be communicated to you by MyChart, letter or phone within 4 business days after the clinic has received the results. If you do not hear from us within 7 days, please contact the clinic through MyChart or phone. If you have a critical or abnormal lab result, we will notify you by phone as soon as possible.  Submit refill requests through [email or web address] or call your pharmacy and they will forward the refill request to us. Please allow 3 business days for your refill to be completed.          Additional Information About Your Visit        Care EveryWhere ID     This is your Care EveryWhere ID. This could be used by other organizations to access your [facility] medical records  [id number]        Your Vitals Were     Pulse Temperature Respirations Height Pulse Oximetry BMI (Body Mass Index)    70 98.4  F (36.9  C) 18 5' 6\" (1.676 m) 96% 23.73 kg/m2       Blood Pressure from Last 3 Encounters:   11/07/18 116/58   10/22/18 132/78   10/16/18 134/69    Weight from Last 3 Encounters:   11/07/18 147 lb (66.7 kg)   10/22/18 139 lb 8 oz (63.3 kg) "   10/16/18 150 lb (68 kg)              Today, you had the following     No orders found for display       Primary Care Provider    VIC Deluna CNP       No address on file        Equal Access to Services     SCOTT FATIMA : Hadii aad ku haddianeo Somagalyali, waaxda luqadaha, qaybta kaalmada adejennifer, bernice rodriges shirineloise ruvalcaba laCourtrobb . [facility] [phone].    ATENCIÓN: Si habla español, tiene a isaac disposición servicios gratuitos de asistencia lingüística. Llame al [phone].    We comply with applicable federal civil rights laws and [place] laws. We do not discriminate on the basis of race, color, national origin, age, disability, sex, sexual orientation, or gender identity.            Thank you!     Thank you for choosing [facility]  for your care. Our goal is always to provide you with excellent care. Hearing back from our patients is one way we can continue to improve our services. Please take a few minutes to complete the written survey that you may receive in the mail after your visit with us. Thank you!             Your Updated Medication List - Protect others around you: Learn how to safely use, store and throw away your medicines at www.disposemymeds.org.          This list is accurate as of 11/8/18 11:59 PM.  Always use your most recent med list.                   Brand Name Dispense Instructions for use Diagnosis    albuterol (2.5 MG/3ML) 0.083% neb solution      Take 1 vial by nebulization every 6 hours as needed        cholecalciferol 1000 units capsule    vitamin  -D     Take 1 capsule by mouth daily        ipratropium - albuterol 0.5 mg/2.5 mg/3 mL 0.5-2.5 (3) MG/3ML neb solution    DUONEB    90 vial    Take 1 vial by nebulization 4 times daily        LASIX PO      Take 20 mg by mouth daily        LEXAPRO PO      Take 5 mg by mouth daily        MAALOX REGULAR STRENGTH 200-200-20 MG/5ML Susp suspension   Generic drug:  alum & mag hydroxide-simethicone      Take 30  mLs by mouth every 2 hours as needed for indigestion        NUTRITIONAL DRINK PO      4oz BID        potassium chloride 10 MEQ CR capsule    MICRO-K     Take 20 mEq by mouth daily        predniSONE 5 MG tablet    DELTASONE     Take 2.5 mg by mouth daily        sennosides 8.6 MG tablet    SENOKOT     Take 1 tablet by mouth 2 times daily        TRAZODONE HCL PO      Take 50 mg by mouth At Bedtime        TYLENOL EX ST ARTHRITIS PAIN 500 MG tablet   Generic drug:  acetaminophen      Take 650 mg by mouth 3 times daily And 650 mg every 4 hours prn

## 2018-11-08 NOTE — LETTER
"    11/8/2018        RE: Benson Kapoor  [facility]  [address] [facility] [address]        [place] [facility]  Chief Complaint   Patient presents with     Annual Comprehensive Nursing Home       [place] Medical Record Number:  [id number]  Place of Service where encounter took place:  [facility] ([facility]) [[id number]]      HPI:    Benson Kapoor is a 89 year old  (5/20/1925), who is being seen today for an annual comprehensive visit.  HPI information obtained from: facility chart records, facility staff, patient report and family/first contact [facility] report.  Today's concerns are:  Chronic bronchitis, unspecified chronic bronchitis type (H)  O2 dependent  She is on chronic oxygen  On duo nebs four times a day   Prednisone daily   Has been doing well the past few weeks    Pulmonary hypertension (H)  Peripheral edema  Intermittent atrial fibrillation (H)  Was noted to have increase in weight, increased edema in left arm and trace in ankles   Responded well to lasix   Echocardiogram showed normal EF 60-65%, no LVH  There was a mild elevation of right arterial pressure   Incidentally noted to be in a fib rate controlled           HTN (hypertension), benign  BP Readings from Last 6 Encounters:   11/07/18 116/58   10/22/18 132/78   10/16/18 134/69   10/08/18 148/64   10/01/18 150/73   09/27/18 159/86       Based on JNC-8 goals,  patients age of 89 year old, no presence of diabetes or CKD, and goals of care goal BP is <150/90 mm Hg. Noted patients BP is lower than goal but symptoms are well controlled with lasix, continue current regimen.      YINA (generalized anxiety disorder)  Primary insomnia  Started on lexapro 2 weeks ago  Trazodone 1 month ago     Reports that she is sleeping well   Reports that she has ocassional poor moods, \"I will use words that I probably should not do but feel good\"            ALLERGIES: Review of patient's allergies indicates no known allergies.  PROBLEM " LIST:  Patient Active Problem List   Diagnosis     CARDIOVASCULAR SCREENING; LDL GOAL LESS THAN 130     GERD (gastroesophageal reflux disease)     HTN (hypertension), benign     COPD (chronic obstructive pulmonary disease) (H)     Grief reaction     Hip fracture, right (H)     Chronic constipation     Health Care Home     Clavicle fracture     Compression fracture of twelfth thoracic vertebra with routine healing     Fall from wheelchair     Hydronephrosis     Hyperglycemia     Hypokalemia     Hypoxia     Mild persistent asthma without complication     Ribs, multiple fractures     Lower urinary tract infectious disease     Late onset Alzheimer's disease without behavioral disturbance     Pulmonary hypertension (H)     O2 dependent     Intermittent atrial fibrillation (H)     PAST MEDICAL HISTORY:  has a past medical history of A fib; Anemia due to blood loss, acute (9/5/2012); DJD (degenerative joint disease); and HTN.  PAST SURGICAL HISTORY:  has a past surgical history that includes surgical history of -  and hysterectomy, cervix status unknown.  FAMILY HISTORY: family history is not on file.  SOCIAL HISTORY:  reports that she has never smoked. She has never used smokeless tobacco. She reports that she does not drink alcohol or use illicit drugs.  IMMUNIZATIONS:  Most Recent Immunizations   Administered Date(s) Administered     FLU 6-35 months 10/19/2017     Flu, Unspecified 12/20/2005     Influenza (High Dose) 3 valent vaccine 10/19/2017     Influenza (IIV3) PF 09/01/2012     Mantoux Tuberculin Skin Test 02/19/2010     Pneumo Conj 13-V (2010&after) 12/14/2016     Pneumococcal 23 valent 02/19/2010     TD (ADULT, 7+) 05/09/2007     Above immunizations pulled from [facility]. [facility] and facility records also reconciled. Outstanding information sent to  to update [facility] .  Future immunizations are not needed at this point as all recommended immunizations are up to date.   MEDICATIONS:  Current  Outpatient Prescriptions   Medication Sig Dispense Refill     acetaminophen (TYLENOL EX ST ARTHRITIS PAIN) 500 MG tablet Take 650 mg by mouth 3 times daily And 650 mg every 4 hours prn       albuterol (2.5 MG/3ML) 0.083% neb solution Take 1 vial by nebulization every 6 hours as needed        alum & mag hydroxide-simethicone (MAALOX REGULAR STRENGTH) 200-200-20 MG/5ML SUSP suspension Take 30 mLs by mouth every 2 hours as needed for indigestion       cholecalciferol (VITAMIN  -D) 1000 units capsule Take 1 capsule by mouth daily       Escitalopram Oxalate (LEXAPRO PO) Take 5 mg by mouth daily       Furosemide (LASIX PO) Take 20 mg by mouth daily       ipratropium - albuterol 0.5 mg/2.5 mg/3 mL (DUONEB) 0.5-2.5 (3) MG/3ML neb solution Take 1 vial by nebulization 4 times daily  90 vial 1     Nutritional Supplements (NUTRITIONAL DRINK PO) 4oz BID       potassium chloride (MICRO-K) 10 MEQ CR capsule Take 20 mEq by mouth daily       predniSONE (DELTASONE) 5 MG tablet Take 1 tablet (5 mg) by mouth daily       sennosides (SENOKOT) 8.6 MG tablet Take 1 tablet by mouth 2 times daily        TRAZODONE HCL PO Take 50 mg by mouth At Bedtime       Medications reviewed:  Medications reconciled to facility chart and changes were made to reflect current medications as identified as above med list. Below are the changes that were made:   Medications stopped since last EPIC medication reconciliation:   There are no discontinued medications.    Medications started since last [facility] medication reconciliation:  No orders of the defined types were placed in this encounter.        Case Management:  I have reviewed the facility/SNF care plan/MDS which was done 10/9/2018, including the falls risk, nutrition and pain screening. I also reviewed the current immunizations, and preventive care.. Patient's desire to return to the community is not present. Current Level of Care is appropriate.        Advance Directive Discussion:    I reviewed the  "current advanced directives as reflected in EPIC, the POLST and the facility chart, and verified the congruency of orders . I contacted the first party Jong and discussed the plan of Care.  I did not due to cognitive impairment review the advance directives with the resident.     Team Discussion:  I communicated with the appropriate disciplines involved with the Plan of Care:   Nursing      Patient Goal:  Patient's goal is pain control and comfort.    Information reviewed:  Medications, vital signs, orders, and nursing notes.    ROS:  4 point ROS including Respiratory, CV, GI and , other than that noted in the HPI,  is negative    Exam:  /58  Pulse 70  Temp 98.4  F (36.9  C)  Resp 18  Ht 5' 6\" (1.676 m)  Wt 147 lb (66.7 kg)  SpO2 96%  BMI 23.73 kg/m2  GENERAL APPEARANCE:  Alert, in no distress  ENT:  Mouth and posterior oropharynx normal, moist mucous membranes  EYES:  EOM, conjunctivae, lids, pupils and irises normal  RESP:  respiratory effort and palpation of chest normal, lungs clear to auscultation , no respiratory distress  CV:  Palpation and auscultation of heart done , regular rate and rhythm, no murmur, rub, or gallop  ABDOMEN:  normal bowel sounds, soft, nontender, no hepatosplenomegaly or other masses  M/S:   Gait and station abnormal wheelchair bound  Digits and nails normal  SKIN:  There is a 15 mm open area on the right anterior shin- wound base is pink, no drainage   NEURO:   Cranial nerves 2-12 are normal tested and grossly at patient's baseline  PSYCH:  memory impaired , affect and mood normal     Lab/Diagnostic data:   CBC RESULTS:   Recent Labs   Lab Test  10/11/18   0724  01/18/18   0600   WBC  6.8  11.5*   RBC  4.11  3.93   HGB  12.5  12.2   HCT  41.9  38.8   MCV  102*  99   MCH  30.4  31.0   MCHC  29.8*  31.4*   RDW  13.5  13.1   PLT  208  253       Last Basic Metabolic Panel:  Recent Labs   Lab Test  10/11/18   0724  09/20/18   0624   NA  140  137   POTASSIUM  3.8  4.0   CHLORIDE "  99  98   OG  8.7  8.3*   CO2  38*  36*   BUN  13  15   CR  0.63  0.56   GLC  76  76       Liver Function Studies -   Recent Labs   Lab Test  03/22/12   0920  12/26/11   1135   PROTTOTAL  7.0  7.6   ALBUMIN  3.6  4.0   BILITOTAL  0.4  0.5   ALKPHOS  78  76   AST  27  29   ALT  9  12       No results found for: TSH]    No results found for: A1C      ASSESSMENT/PLAN  Chronic bronchitis, unspecified chronic bronchitis type (H)  O2 dependent  Stable   ?will attempt to wean off prednisone and monitor respiratory status   Breathing improved with daily lasix      Pulmonary hypertension (H)  New diagnosis   Breathing improved with daily lasix   Will continue to monitor    Peripheral edema  Improved with lasix   Has small open area on shin   She does not like the bandage and wants it off     Intermittent atrial fibrillation (H)  new diagnosis- incidental finding on echo   Rate controlled   Not a good candidate for anticoagulation     HTN (hypertension), benign  Stable on current regimen     YINA (generalized anxiety disorder)  Primary insomnia  Improved with lexapro   May do better off steroid      Bilateral impacted cerumen  Will use debrox and irrigate ears       Orders:  1.  Ok to keep right shin open to air  2.  Debrox ear drops--4 drops to both ears BID x3 days then irrigate  3.  Change weight checks to once a week  4.  Next lab day:  CMP  5.  Decrease prednisone to 2.5mg for 1 week and then stop     Electronically signed by:  Rajni Shah NP          Sincerely,        Rajni Shah NP

## 2018-12-06 NOTE — PROGRESS NOTES
[place] [facility]    Chief Complaint   Patient presents with     [facility] Acute       [place] Medical Record Number:  [id number]  Place of Service where encounter took place:  [facility] ([facility] [[id number]]    HPI:    Benson Kapoor is a 89 year old  (5/20/1925), who is being seen today for an episodic care visit.  HPI information obtained from: facility chart records and facility staff. Today's concern is:    Was asked by nursing to looked at patient toe which was noted to have an open sores and erythema this week   Patient reports that it is from her sock and denies any injury (she has alzheimer and is a poor historian)    REVIEW OF SYSTEMS:  Unobtainable secondary to cognitive impairment.     /84  Pulse 82  Temp 97.6  F (36.4  C)  Resp 18  Wt 146 lb 9.6 oz (66.5 kg)  SpO2 93%  BMI 23.66 kg/m2  GENERAL APPEARANCE:  Alert, in no distress   there is a 5 mm open wound on the dorsal aspect of the second toe on the right foot   The wound bed is covered in yellow-green slough   The skin surrounding is erythematous and streaking   There is edema of the toe     ASSESSMENT/PLAN:     Cellulitis of toe of right foot  Late onset Alzheimer's disease without behavioral disturbance     Will treat with antibiotic   Keep wound bed clean and dry   Will check next week       Orders:  1.  Keflex 500mg PO TID x1 week  2.  Betadine to right second toe wound and apply bandaid.  Change BID      Total time spent with patient visit at the skilled nursing facility was 25 min including patient visit and review of past records. Greater than 50% of total time spent with counseling and coordinating care due to impaired cognition    Electronically signed by:  Rajni Shah NP

## 2018-12-06 NOTE — LETTER
12/6/2018        RE: Benson Kapoor    [address] [facility] [address]        [place] [facility]    Chief Complaint   Patient presents with     FCI Acute       [place] Medical Record Number:  [id number]  Place of Service where encounter took place:  [facility] [[id number]]    HPI:    Benson Kapoor is a 89 year old  (5/20/1925), who is being seen today for an episodic care visit.  HPI information obtained from: facility chart records and facility staff. Today's concern is:    Was asked by nursing to looked at patient toe which was noted to have an open sores and erythema this week   Patient reports that it is from her sock and denies any injury (she has alzheimer and is a poor historian)    REVIEW OF SYSTEMS:  Unobtainable secondary to cognitive impairment.     /84  Pulse 82  Temp 97.6  F (36.4  C)  Resp 18  Wt 146 lb 9.6 oz (66.5 kg)  SpO2 93%  BMI 23.66 kg/m2  GENERAL APPEARANCE:  Alert, in no distress   there is a 5 mm open wound on the dorsal aspect of the second toe on the right foot   The wound bed is covered in yellow-green slough   The skin surrounding is erythematous and streaking   There is edema of the toe     ASSESSMENT/PLAN:     Cellulitis of toe of right foot  Late onset Alzheimer's disease without behavioral disturbance     Will treat with antibiotic   Keep wound bed clean and dry   Will check next week       Orders:  1.  Keflex 500mg PO TID x1 week  2.  Betadine to right second toe wound and apply bandaid.  Change BID      Total time spent with patient visit at the skilled nursing facility was 25 min including patient visit and review of past records. Greater than 50% of total time spent with counseling and coordinating care due to impaired cognition    Electronically signed by:  Rajni Shah NP      Sincerely,        Rajni Shah NP

## 2018-12-06 NOTE — MR AVS SNAPSHOT
After Visit Summary   12/6/2018    Benson Kapoor    MRN: [id number]           Patient Information     Date Of Birth          5/20/1925        Visit Information        Provider Department      12/6/2018 11:00 AM Rajni Shah NP [facility]        Today's Diagnoses     Cellulitis of toe of right foot    -  1    Late onset Alzheimer's disease without behavioral disturbance           Follow-ups after your visit        Who to contact     If you have questions or need follow up information about today's clinic visit or your schedule please contact [facility] directly at [phone].  Normal or non-critical lab and imaging results will be communicated to you by MyChart, letter or phone within 4 business days after the clinic has received the results. If you do not hear from us within 7 days, please contact the clinic through MyChart or phone. If you have a critical or abnormal lab result, we will notify you by phone as soon as possible.  Submit refill requests through [employer] or call your pharmacy and they will forward the refill request to us. Please allow 3 business days for your refill to be completed.          Additional Information About Your Visit        Care EveryWhere ID     This is your Care EveryWhere ID. This could be used by other organizations to access your [place] medical records  [id number]        Your Vitals Were     Pulse Temperature Respirations Pulse Oximetry BMI (Body Mass Index)       82 97.6  F (36.4  C) 18 93% 23.66 kg/m2        Blood Pressure from Last 3 Encounters:   12/06/18 138/84   11/07/18 116/58   10/22/18 132/78    Weight from Last 3 Encounters:   12/06/18 146 lb 9.6 oz (66.5 kg)   11/07/18 147 lb (66.7 kg)   10/22/18 139 lb 8 oz (63.3 kg)              Today, you had the following     No orders found for display       Primary Care Provider    VIC Deluna CNP       No address on file        Equal Access to Services     SCOTT  GAAR : Hadii aad ku haddianeo Somagalyali, waaxda luqadaha, qaybta kaalmada adeegtre, bernice gaetanoin hayaan rea peg sulema . So [facility] [phone].    ATENCIÓN: Si habla español, tiene a isaac disposición servicios gratuitos de asistencia lingüística. Llame al [phone].    We comply with applicable federal civil rights laws and [place] laws. We do not discriminate on the basis of race, color, national origin, age, disability, sex, sexual orientation, or gender identity.            Thank you!     Thank you for choosing [facility]  for your care. Our goal is always to provide you with excellent care. Hearing back from our patients is one way we can continue to improve our services. Please take a few minutes to complete the written survey that you may receive in the mail after your visit with us. Thank you!             Your Updated Medication List - Protect others around you: Learn how to safely use, store and throw away your medicines at www.disposemymeds.org.          This list is accurate as of 12/6/18 11:59 PM.  Always use your most recent med list.                   Brand Name Dispense Instructions for use Diagnosis    albuterol (2.5 MG/3ML) 0.083% neb solution    PROVENTIL     Take 1 vial by nebulization every 6 hours as needed        cholecalciferol 1000 units (25 mcg) capsule    VITAMIN D3     Take 1 capsule by mouth daily        ipratropium - albuterol 0.5 mg/2.5 mg/3 mL 0.5-2.5 (3) MG/3ML neb solution    DUONEB    90 vial    Take 1 vial by nebulization 4 times daily        KEFLEX PO      Take 500 mg by mouth 3 times daily        LASIX PO      Take 20 mg by mouth daily        LEXAPRO PO      Take 5 mg by mouth daily        MAALOX REGULAR STRENGTH 200-200-20 MG/5ML Susp suspension   Generic drug:  alum & mag hydroxide-simethicone      Take 30 mLs by mouth every 2 hours as needed for indigestion        NUTRITIONAL DRINK PO      4oz BID        potassium chloride ER 10 MEQ CR capsule    MICRO-K      Take 20 mEq by mouth daily        sennosides 8.6 MG tablet    SENOKOT     Take 1 tablet by mouth 2 times daily        TRAZODONE HCL PO      Take 50 mg by mouth At Bedtime        TYLENOL EX ST ARTHRITIS PAIN 500 MG tablet   Generic drug:  acetaminophen      Take 650 mg by mouth 3 times daily And 650 mg every 4 hours prn

## 2018-12-18 NOTE — PROGRESS NOTES
[place] [facility]  THIS NOTE ADDENDED  Chief Complaint   Patient presents with     MCC Regulatory   [place] Medical Record Number:  [id number]  Place of Service where encounter took place:  [facility] [[id number]]    HPI:    Benson Kapoor is a 89 year old  (5/20/1925), who is being seen today for a federally mandated E/M visit.  HPI information obtained from: facility chart records, facility staff, patient report and [facility] chart review.     Today's concerns are:  -  - Resident seen and examined. Reports hard to breath at times, but better with breathing treatment. Denies wheezing.   - Reports sleep, appetite and BM are fine.   - RN  reports that the resident yells out, demanding staff to stay with her bathroom.  When the bathroom possibly secondary to claustrophobia.  - GNP reports Resident was treated for cellulitis early this month with abx, noted weight increased with pedal edema and crackles on exam, Echo was done showed EF 60-65%, no LVH, a-fib rate controlled (not a good candidate for anti coagulation), sx improved with lasix. Prednisone  for dyspnea was weaned off by hospice. started on lexapro due to anxiety and insomnia especially at bedtime- sleep is improved and moods are better   --------------------------------  - - Past Medical, social, family histories, medications, and allergies reviewed and updated  - Medications reviewed: in the chart and EHR.   - Case Management:   I have reviewed the care plan and MDS and do agree with the plan. Patient's desire to return to the community is not present.  Information reviewed:  Medications, vital signs, orders, and nursing notes.    MEDICATIONS:  Current Outpatient Medications   Medication Sig Dispense Refill     acetaminophen (TYLENOL EX ST ARTHRITIS PAIN) 500 MG tablet Take 650 mg by mouth 3 times daily And 650 mg every 4 hours prn       albuterol (2.5 MG/3ML) 0.083% neb solution Take 1 vial by nebulization every 6  hours as needed        alum & mag hydroxide-simethicone (MAALOX REGULAR STRENGTH) 200-200-20 MG/5ML SUSP suspension Take 30 mLs by mouth every 2 hours as needed for indigestion       cholecalciferol (VITAMIN  -D) 1000 units capsule Take 1 capsule by mouth daily       Escitalopram Oxalate (LEXAPRO PO) Take 5 mg by mouth daily       Furosemide (LASIX PO) Take 20 mg by mouth daily       ipratropium - albuterol 0.5 mg/2.5 mg/3 mL (DUONEB) 0.5-2.5 (3) MG/3ML neb solution Take 1 vial by nebulization 4 times daily  90 vial 1     Nutritional Supplements (NUTRITIONAL DRINK PO) 4oz BID       potassium chloride (MICRO-K) 10 MEQ CR capsule Take 20 mEq by mouth daily       sennosides (SENOKOT) 8.6 MG tablet Take 1 tablet by mouth 2 times daily        TRAZODONE HCL PO Take 50 mg by mouth At Bedtime       ROS: Limited secondary to cognitive impairment but today pt reports - see HPI    Exam:  Vitals: /77   Pulse 84   Temp 98.4  F (36.9  C)   Resp 21   Wt 65.3 kg (144 lb)   SpO2 93%   BMI 23.24 kg/m    BMI= Body mass index is 23.24 kg/m .  GENERAL APPEARANCE: awake, non cooperative  RESP:  respiratory effort and palpation of chest normal, diminished air entry b./l, no wheezing or coarse sound.   CV:  Palpation and auscultation of heart done ,fast and  Irregular rate and rhythm, no murmur, rub, or gallop, no edema  ABDOMEN:  normal bowel sounds, soft, nontender, no hepatosplenomegaly or other masses  M/S:   Gait and station abnormal uses WC, self proper.   SKIN: blanchable erythema and cold toes.   NEURO:   no NFD appreciated on observation  PSYCH: mood and affect fine.     Lab/Diagnostic data:   CBC RESULTS:   Recent Labs   Lab Test 10/11/18  0724 01/18/18  0600   WBC 6.8 11.5*   RBC 4.11 3.93   HGB 12.5 12.2   HCT 41.9 38.8   * 99   MCH 30.4 31.0   MCHC 29.8* 31.4*   RDW 13.5 13.1    253       Last Basic Metabolic Panel:  Recent Labs   Lab Test 10/11/18  0724 09/20/18  0624    137   POTASSIUM 3.8 4.0    CHLORIDE 99 98   OG 8.7 8.3*   CO2 38* 36*   BUN 13 15   CR 0.63 0.56   GLC 76 76       Liver Function Studies -   Recent Labs   Lab Test 03/22/12  0920 12/26/11  1135   PROTTOTAL 7.0 7.6   ALBUMIN 3.6 4.0   BILITOTAL 0.4 0.5   ALKPHOS 78 76   AST 27 29   ALT 9 12       ASSESSMENT/PLAN  Chronic respiratory failure, unspecified whether with hypoxia or hypercapnia (H)  Moderate Asthma, w/o complication,   - O2 dependant. At baseline- some LANDERS>   -Scheduled DuoNeb and as needed, continue    Diastolic CHF:  - EF 60-65%, w/o LVH.   -Lasix and potassium supplement, compensated, continue meds    Insomnia/anxiety:  -On trazodone and Lexapro.  GDR trazodone when feasible, continue Lexapro for now.     Hx of compression Fx of T12  Frail:  - WC bound but self propel  - .analgesia optimal  - Significant  Deficits requiring NH placement. Requiring extensive assistance from nursing. Keeps strolling the hallways back and forth      cognitive impairment:  - BIMS 7/15 [October 2, 2018].  - Continue to anticipate needs. Chronic condition, ongoing decline expected.   -  Continue to provide redirection and reassurance as needed. Maintain safe living situation with goals focused on comfort.    Orders:  - See above, otherwise, continue the rest of the current POC.     Total time spent with patient visit at the skilled nursing facility was 35 min including patient visit, review of past records and Review nurse practitioner notes, discussing with the nursing staff. Greater than 50% of total time spent with counseling and coordinating care due to Above multiple comorbidities, recommendations.       Electronically signed by:  Leida Johnson MD

## 2018-12-19 NOTE — LETTER
12/19/2018        RE: Benson Kapoor  [facility]  [address] [facility] [address]        [place] [facility]  THIS NOTE ADDENDED  Chief Complaint   Patient presents with     long term Regulatory   [place] Medical Record Number:  [id number]  Place of Service where encounter took place:  [facility] ([facility] [[id number]]    HPI:    Benson Kapoor is a 89 year old  (5/20/1925), who is being seen today for a federally mandated E/M visit.  HPI information obtained from: facility chart records, facility staff, patient report and [facility] chart review.     Today's concerns are:  -  - Resident seen and examined. Reports hard to breath at times, but better with breathing treatment. Denies wheezing.   - Reports sleep, appetite and BM are fine.   - RN  reports that the resident yells out, demanding staff to stay with her bathroom.  When the bathroom possibly secondary to claustrophobia.  - GNP reports Resident was treated for cellulitis early this month with abx, noted weight increased with pedal edema and crackles on exam, Echo was done showed EF 60-65%, no LVH, a-fib rate controlled (not a good candidate for anti coagulation), sx improved with lasix. Prednisone  for dyspnea was weaned off by hospice. started on lexapro due to anxiety and insomnia especially at bedtime- sleep is improved and moods are better   --------------------------------  - - Past Medical, social, family histories, medications, and allergies reviewed and updated  - Medications reviewed: in the chart and EHR.   - Case Management:   I have reviewed the care plan and MDS and do agree with the plan. Patient's desire to return to the community is not present.  Information reviewed:  Medications, vital signs, orders, and nursing notes.    MEDICATIONS:  Current Outpatient Medications   Medication Sig Dispense Refill     acetaminophen (TYLENOL EX ST ARTHRITIS PAIN) 500 MG tablet Take 650 mg by mouth 3 times daily  And 650 mg every 4 hours prn       albuterol (2.5 MG/3ML) 0.083% neb solution Take 1 vial by nebulization every 6 hours as needed        alum & mag hydroxide-simethicone (MAALOX REGULAR STRENGTH) 200-200-20 MG/5ML SUSP suspension Take 30 mLs by mouth every 2 hours as needed for indigestion       cholecalciferol (VITAMIN  -D) 1000 units capsule Take 1 capsule by mouth daily       Escitalopram Oxalate (LEXAPRO PO) Take 5 mg by mouth daily       Furosemide (LASIX PO) Take 20 mg by mouth daily       ipratropium - albuterol 0.5 mg/2.5 mg/3 mL (DUONEB) 0.5-2.5 (3) MG/3ML neb solution Take 1 vial by nebulization 4 times daily  90 vial 1     Nutritional Supplements (NUTRITIONAL DRINK PO) 4oz BID       potassium chloride (MICRO-K) 10 MEQ CR capsule Take 20 mEq by mouth daily       sennosides (SENOKOT) 8.6 MG tablet Take 1 tablet by mouth 2 times daily        TRAZODONE HCL PO Take 50 mg by mouth At Bedtime       ROS: Limited secondary to cognitive impairment but today pt reports - see HPI    Exam:  Vitals: /77   Pulse 84   Temp 98.4  F (36.9  C)   Resp 21   Wt 65.3 kg (144 lb)   SpO2 93%   BMI 23.24 kg/m     BMI= Body mass index is 23.24 kg/m .  GENERAL APPEARANCE: awake, non cooperative  RESP:  respiratory effort and palpation of chest normal, diminished air entry b./l, no wheezing or coarse sound.   CV:  Palpation and auscultation of heart done ,fast and  Irregular rate and rhythm, no murmur, rub, or gallop, no edema  ABDOMEN:  normal bowel sounds, soft, nontender, no hepatosplenomegaly or other masses  M/S:   Gait and station abnormal uses WC, self proper.   SKIN: blanchable erythema and cold toes.   NEURO:   no NFD appreciated on observation  PSYCH: mood and affect fine.     Lab/Diagnostic data:   CBC RESULTS:   Recent Labs   Lab Test 10/11/18  0724 01/18/18  0600   WBC 6.8 11.5*   RBC 4.11 3.93   HGB 12.5 12.2   HCT 41.9 38.8   * 99   MCH 30.4 31.0   MCHC 29.8* 31.4*   RDW 13.5 13.1    253        Last Basic Metabolic Panel:  Recent Labs   Lab Test 10/11/18  0724 09/20/18  0624    137   POTASSIUM 3.8 4.0   CHLORIDE 99 98   OG 8.7 8.3*   CO2 38* 36*   BUN 13 15   CR 0.63 0.56   GLC 76 76       Liver Function Studies -   Recent Labs   Lab Test 03/22/12  0920 12/26/11  1135   PROTTOTAL 7.0 7.6   ALBUMIN 3.6 4.0   BILITOTAL 0.4 0.5   ALKPHOS 78 76   AST 27 29   ALT 9 12       ASSESSMENT/PLAN  Chronic respiratory failure, unspecified whether with hypoxia or hypercapnia (H)  Moderate Asthma, w/o complication,   - O2 dependant. At baseline- some LANDERS>   -Scheduled DuoNeb and as needed, continue    Diastolic CHF:  - EF 60-65%, w/o LVH.   -Lasix and potassium supplement, compensated, continue meds    Insomnia/anxiety:  -On trazodone and Lexapro.  GDR trazodone when feasible, continue Lexapro for now.     Hx of compression Fx of T12  Frail:  - WC bound but self propel  - .analgesia optimal  - Significant  Deficits requiring NH placement. Requiring extensive assistance from nursing. Keeps strolling the hallways back and forth      cognitive impairment:  - BIMS 7/15 [October 2, 2018].  - Continue to anticipate needs. Chronic condition, ongoing decline expected.   -  Continue to provide redirection and reassurance as needed. Maintain safe living situation with goals focused on comfort.    Orders:  - See above, otherwise, continue the rest of the current POC.     Total time spent with patient visit at the skilled nursing facility was 35 min including patient visit, review of past records and Review nurse practitioner notes, discussing with the nursing staff. Greater than 50% of total time spent with counseling and coordinating care due to Above multiple comorbidities, recommendations.       Electronically signed by:  Leida Johnson MD        Sincerely,        Leida Johnson MD

## 2018-12-19 NOTE — LETTER
12/19/2018        RE: Benson Kapoor  [facility]  [address] [facility] [address]        [place] [facility]  Chief Complaint   Patient presents with     residential Regulatory   [place] Medical Record Number:  [id number]  Place of Service where encounter took place:  [facility] ([facility] [[id number]]    HPI:    Benson Kapoor is a 89 year old  (5/20/1925), who is being seen today for a federally mandated E/M visit.  HPI information obtained from: facility chart records, facility staff, patient report and [facility] chart review.     Today's concerns are:  -  - Resident seen and examined. Reports hard to breath at times, but better with breathing treatment. Denies wheezing.   - Reports sleep, appetite and BM are fine.   - RN noted that the toes are bluish and cold. Resident has denies pain, numbness/tingling.  Also reports that the resident yells out, demanding staff to stay with her bathroom.  When the bathroom possibly secondary to claustrophobia.  - GNP reports Resident was treated for cellulitis early this month with abx, noted weight increased with pedal edema and crackles on exam, Echo was done showed EF 60-65%, no LVH, a-fib rate controlled (not a good candidate for anti coagulation), sx improved with lasix. Prednisone  for dyspnea was weaned off by hospice. started on lexapro due to anxiety and insomnia especially at bedtime- sleep is improved and moods are better   --------------------------------  - - Past Medical, social, family histories, medications, and allergies reviewed and updated  - Medications reviewed: in the chart and EHR.   - Case Management:   I have reviewed the care plan and MDS and do agree with the plan. Patient's desire to return to the community is not present.  Information reviewed:  Medications, vital signs, orders, and nursing notes.    MEDICATIONS:  Current Outpatient Medications   Medication Sig Dispense Refill     acetaminophen (TYLENOL EX  ST ARTHRITIS PAIN) 500 MG tablet Take 650 mg by mouth 3 times daily And 650 mg every 4 hours prn       albuterol (2.5 MG/3ML) 0.083% neb solution Take 1 vial by nebulization every 6 hours as needed        alum & mag hydroxide-simethicone (MAALOX REGULAR STRENGTH) 200-200-20 MG/5ML SUSP suspension Take 30 mLs by mouth every 2 hours as needed for indigestion       cholecalciferol (VITAMIN  -D) 1000 units capsule Take 1 capsule by mouth daily       Escitalopram Oxalate (LEXAPRO PO) Take 5 mg by mouth daily       Furosemide (LASIX PO) Take 20 mg by mouth daily       ipratropium - albuterol 0.5 mg/2.5 mg/3 mL (DUONEB) 0.5-2.5 (3) MG/3ML neb solution Take 1 vial by nebulization 4 times daily  90 vial 1     Nutritional Supplements (NUTRITIONAL DRINK PO) 4oz BID       potassium chloride (MICRO-K) 10 MEQ CR capsule Take 20 mEq by mouth daily       sennosides (SENOKOT) 8.6 MG tablet Take 1 tablet by mouth 2 times daily        TRAZODONE HCL PO Take 50 mg by mouth At Bedtime       ROS: Limited secondary to cognitive impairment but today pt reports - see HPI    Exam:  Vitals: /77   Pulse 84   Temp 98.4  F (36.9  C)   Resp 21   Wt 65.3 kg (144 lb)   SpO2 93%   BMI 23.24 kg/m     BMI= Body mass index is 23.24 kg/m .  GENERAL APPEARANCE: awake, non cooperative  RESP:  respiratory effort and palpation of chest normal, diminished air entry b./l, no wheezing or coarse sound.   CV:  Palpation and auscultation of heart done ,fast and  Irregular rate and rhythm, no murmur, rub, or gallop, no edema  ABDOMEN:  normal bowel sounds, soft, nontender, no hepatosplenomegaly or other masses  M/S:   Gait and station abnormal uses WC, self proper.   SKIN:   Discoloration over the toes which are called and she will, good dorsal pedal artery pulse  NEURO:   no NFD appreciated on observation  PSYCH: mood and affect fine.     Lab/Diagnostic data:   CBC RESULTS:   Recent Labs   Lab Test 10/11/18  0724 01/18/18  0600   WBC 6.8 11.5*   RBC  4.11 3.93   HGB 12.5 12.2   HCT 41.9 38.8   * 99   MCH 30.4 31.0   MCHC 29.8* 31.4*   RDW 13.5 13.1    253       Last Basic Metabolic Panel:  Recent Labs   Lab Test 10/11/18  0724 09/20/18  0624    137   POTASSIUM 3.8 4.0   CHLORIDE 99 98   OG 8.7 8.3*   CO2 38* 36*   BUN 13 15   CR 0.63 0.56   GLC 76 76       Liver Function Studies -   Recent Labs   Lab Test 03/22/12  0920 12/26/11  1135   PROTTOTAL 7.0 7.6   ALBUMIN 3.6 4.0   BILITOTAL 0.4 0.5   ALKPHOS 78 76   AST 27 29   ALT 9 12       ASSESSMENT/PLAN  Chronic respiratory failure, unspecified whether with hypoxia or hypercapnia (H)  Moderate Asthma, w/o complication,   - O2 dependant. At baseline- some LANDERS>   -Scheduled DuoNeb and as needed, continue    Diastolic CHF:  - EF 60-65%, w/o LVH.   -Lasix and potassium supplement, compensated, continue meds    Insomnia/anxiety:  -On trazodone and Lexapro.  GDR trazodone when feasible, continue Lexapro for now.     Raynaud's disease  - benign, asymptomatic. If becomes severe and symptomatic may start small dose of amlodipine for the season. Keep feet warm.     Hx of compression Fx of T12  Frail:  - WC bound but self propel  - .analgesia optimal  - Significant  Deficits requiring NH placement. Requiring extensive assistance from nursing. Keeps strolling the hallways back and forth      cognitive impairment:  - BIMS 7/15 [October 2, 2018].  - Continue to anticipate needs. Chronic condition, ongoing decline expected.   -  Continue to provide redirection and reassurance as needed. Maintain safe living situation with goals focused on comfort.    Orders:  - See above, otherwise, continue the rest of the current POC.     Total time spent with patient visit at the skilled nursing facility was 35 min including patient visit, review of past records and Review nurse practitioner notes, discussing with the nursing staff. Greater than 50% of total time spent with counseling and coordinating care due to Above  multiple comorbidities, recommendations.       Electronically signed by:  Leida Johnson MD        Sincerely,        Leida Johnson MD

## 2019-01-01 ENCOUNTER — NURSING HOME VISIT (OUTPATIENT)
Dept: FAMILY MEDICINE | Facility: CLINIC | Age: 84
End: 2019-01-01
Payer: COMMERCIAL

## 2019-01-01 ENCOUNTER — HOSPITAL LABORATORY (OUTPATIENT)
Facility: OTHER | Age: 84
End: 2019-01-01

## 2019-01-01 ENCOUNTER — NURSING HOME VISIT (OUTPATIENT)
Dept: GERIATRICS | Facility: CLINIC | Age: 84
End: 2019-01-01
Payer: COMMERCIAL

## 2019-01-01 VITALS
RESPIRATION RATE: 18 BRPM | OXYGEN SATURATION: 95 % | BODY MASS INDEX: 22.05 KG/M2 | TEMPERATURE: 98.4 F | HEIGHT: 66 IN | SYSTOLIC BLOOD PRESSURE: 142 MMHG | WEIGHT: 137.2 LBS | DIASTOLIC BLOOD PRESSURE: 78 MMHG | HEART RATE: 78 BPM

## 2019-01-01 VITALS
HEART RATE: 86 BPM | SYSTOLIC BLOOD PRESSURE: 147 MMHG | RESPIRATION RATE: 22 BRPM | OXYGEN SATURATION: 96 % | BODY MASS INDEX: 22.19 KG/M2 | WEIGHT: 137.5 LBS | DIASTOLIC BLOOD PRESSURE: 60 MMHG | TEMPERATURE: 97.8 F

## 2019-01-01 VITALS
SYSTOLIC BLOOD PRESSURE: 116 MMHG | WEIGHT: 130.5 LBS | BODY MASS INDEX: 20.97 KG/M2 | OXYGEN SATURATION: 91 % | TEMPERATURE: 97.6 F | DIASTOLIC BLOOD PRESSURE: 73 MMHG | HEIGHT: 66 IN | RESPIRATION RATE: 18 BRPM | HEART RATE: 89 BPM

## 2019-01-01 VITALS
OXYGEN SATURATION: 93 % | HEART RATE: 89 BPM | BODY MASS INDEX: 20.97 KG/M2 | DIASTOLIC BLOOD PRESSURE: 73 MMHG | TEMPERATURE: 97.6 F | SYSTOLIC BLOOD PRESSURE: 116 MMHG | RESPIRATION RATE: 18 BRPM | HEIGHT: 66 IN | WEIGHT: 130.5 LBS

## 2019-01-01 VITALS
TEMPERATURE: 97.4 F | SYSTOLIC BLOOD PRESSURE: 123 MMHG | DIASTOLIC BLOOD PRESSURE: 68 MMHG | HEART RATE: 74 BPM | OXYGEN SATURATION: 97 % | RESPIRATION RATE: 20 BRPM

## 2019-01-01 VITALS
WEIGHT: 137.5 LBS | TEMPERATURE: 97.3 F | BODY MASS INDEX: 22.19 KG/M2 | OXYGEN SATURATION: 95 % | DIASTOLIC BLOOD PRESSURE: 72 MMHG | HEART RATE: 85 BPM | SYSTOLIC BLOOD PRESSURE: 134 MMHG | RESPIRATION RATE: 20 BRPM

## 2019-01-01 DIAGNOSIS — I48.0 INTERMITTENT ATRIAL FIBRILLATION (H): ICD-10-CM

## 2019-01-01 DIAGNOSIS — I50.30 (HFPEF) HEART FAILURE WITH PRESERVED EJECTION FRACTION (H): Primary | ICD-10-CM

## 2019-01-01 DIAGNOSIS — Z51.5 HOSPICE CARE PATIENT: ICD-10-CM

## 2019-01-01 DIAGNOSIS — G47.00 PERSISTENT INSOMNIA: ICD-10-CM

## 2019-01-01 DIAGNOSIS — F43.22 ADJUSTMENT DISORDER WITH ANXIETY: ICD-10-CM
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
## 2019-01-01 DIAGNOSIS — R63.4 LOSS OF WEIGHT: ICD-10-CM

## 2019-01-01 DIAGNOSIS — G30.1 LATE ONSET ALZHEIMER'S DISEASE WITHOUT BEHAVIORAL DISTURBANCE (H): ICD-10-CM

## 2019-01-01 DIAGNOSIS — J42 CHRONIC BRONCHITIS, UNSPECIFIED CHRONIC BRONCHITIS TYPE (H): ICD-10-CM

## 2019-01-01 DIAGNOSIS — R60.0 PERIPHERAL EDEMA: ICD-10-CM

## 2019-01-01 DIAGNOSIS — F02.818 LATE ONSET ALZHEIMER'S DISEASE WITH BEHAVIORAL DISTURBANCE (H): ICD-10-CM

## 2019-01-01 DIAGNOSIS — F02.80 LATE ONSET ALZHEIMER'S DISEASE WITHOUT BEHAVIORAL DISTURBANCE (H): Primary | ICD-10-CM

## 2019-01-01 DIAGNOSIS — I50.30 (HFPEF) HEART FAILURE WITH PRESERVED EJECTION FRACTION (H): ICD-10-CM

## 2019-01-01 DIAGNOSIS — G30.1 LATE ONSET ALZHEIMER'S DISEASE WITHOUT BEHAVIORAL DISTURBANCE (H): Primary | ICD-10-CM

## 2019-01-01 DIAGNOSIS — R07.89 LEFT-SIDED CHEST WALL PAIN: Primary | ICD-10-CM

## 2019-01-01 DIAGNOSIS — F02.80 LATE ONSET ALZHEIMER'S DISEASE WITHOUT BEHAVIORAL DISTURBANCE (H): ICD-10-CM

## 2019-01-01 DIAGNOSIS — J96.10 CHRONIC RESPIRATORY FAILURE, UNSPECIFIED WHETHER WITH HYPOXIA OR HYPERCAPNIA (H): Primary | ICD-10-CM
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
## 2019-01-01 DIAGNOSIS — G30.1 LATE ONSET ALZHEIMER'S DISEASE WITH BEHAVIORAL DISTURBANCE (H): ICD-10-CM

## 2019-01-01 DIAGNOSIS — G47.00 INSOMNIA, UNSPECIFIED TYPE: ICD-10-CM

## 2019-01-01 DIAGNOSIS — J44.1 COPD EXACERBATION (H): ICD-10-CM

## 2019-01-01 DIAGNOSIS — J45.909 MODERATE ASTHMA WITHOUT COMPLICATION, UNSPECIFIED WHETHER PERSISTENT: ICD-10-CM

## 2019-01-01 DIAGNOSIS — J96.10 CHRONIC RESPIRATORY FAILURE, UNSPECIFIED WHETHER WITH HYPOXIA OR HYPERCAPNIA (H): ICD-10-CM

## 2019-01-01 LAB
ANION GAP SERPL CALCULATED.3IONS-SCNC: 6 MMOL/L (ref 3–14)
BUN SERPL-MCNC: 21 MG/DL (ref 7–30)
CALCIUM SERPL-MCNC: 8.6 MG/DL (ref 8.5–10.1)
CHLORIDE SERPL-SCNC: 101 MMOL/L (ref 94–109)
CO2 SERPL-SCNC: 33 MMOL/L (ref 20–32)
CREAT SERPL-MCNC: 0.63 MG/DL (ref 0.52–1.04)
ERYTHROCYTE [DISTWIDTH] IN BLOOD BY AUTOMATED COUNT: 14 % (ref 10–15)
GFR SERPL CREATININE-BSD FRML MDRD: 77 ML/MIN/{1.73_M2}
GLUCOSE SERPL-MCNC: 109 MG/DL (ref 70–99)
HCT VFR BLD AUTO: 37.8 % (ref 35–47)
HGB BLD-MCNC: 11.6 G/DL (ref 11.7–15.7)
MCH RBC QN AUTO: 30.5 PG (ref 26.5–33)
MCHC RBC AUTO-ENTMCNC: 30.7 G/DL (ref 31.5–36.5)
MCV RBC AUTO: 100 FL (ref 78–100)
PLATELET # BLD AUTO: 237 10E9/L (ref 150–450)
POTASSIUM SERPL-SCNC: 4.7 MMOL/L (ref 3.4–5.3)
RBC # BLD AUTO: 3.8 10E12/L (ref 3.8–5.2)
SODIUM SERPL-SCNC: 140 MMOL/L (ref 133–144)
WBC # BLD AUTO: 8.1 10E9/L (ref 4–11)

## 2019-01-01 PROCEDURE — 99310 SBSQ NF CARE HIGH MDM 45: CPT | Performed by: NURSE PRACTITIONER

## 2019-01-01 PROCEDURE — 99309 SBSQ NF CARE MODERATE MDM 30: CPT | Mod: GW | Performed by: NURSE PRACTITIONER

## 2019-01-01 PROCEDURE — 99309 SBSQ NF CARE MODERATE MDM 30: CPT | Mod: GW | Performed by: FAMILY MEDICINE

## 2019-01-01 PROCEDURE — 99308 SBSQ NF CARE LOW MDM 20: CPT | Mod: GW | Performed by: NURSE PRACTITIONER

## 2019-01-01 RX ORDER — MORPHINE SULFATE 30 MG/1
5 TABLET ORAL
COMMUNITY

## 2019-01-01 RX ORDER — TRAMADOL HYDROCHLORIDE 50 MG/1
50 TABLET ORAL EVERY 6 HOURS PRN
Qty: 20 TABLET | Refills: 0 | Status: SHIPPED | OUTPATIENT
Start: 2019-01-01 | End: 2019-01-01

## 2019-01-01 RX ORDER — LORAZEPAM 0.5 MG/1
0.5 TABLET ORAL 2 TIMES DAILY
COMMUNITY

## 2019-01-01 RX ORDER — GUAIFENESIN 600 MG/1
1200 TABLET, EXTENDED RELEASE ORAL 2 TIMES DAILY
COMMUNITY
Start: 2019-01-01 | End: 2019-01-01

## 2019-01-01 RX ORDER — TRIAMCINOLONE ACETONIDE 5 MG/G
CREAM TOPICAL DAILY PRN
COMMUNITY

## 2019-01-01 RX ORDER — LORAZEPAM 0.5 MG/1
0.5 TABLET ORAL AT BEDTIME
COMMUNITY

## 2019-01-01 RX ORDER — PREDNISONE 20 MG/1
40 TABLET ORAL DAILY
COMMUNITY
End: 2019-01-01

## 2019-01-01 RX ORDER — MORPHINE SULFATE 30 MG/1
5 TABLET ORAL 2 TIMES DAILY
COMMUNITY

## 2019-01-01 ASSESSMENT — MIFFLIN-ST. JEOR
SCORE: 1044.09
SCORE: 1013.69
SCORE: 1013.69

## 2019-01-01 ASSESSMENT — PAIN SCALES - GENERAL: PAINLEVEL: NO PAIN (0)

## 2019-01-03 NOTE — LETTER
"    1/3/2019        RE: Benson Kapoor  [facility]  [address] [facility] [address]        [place] [facility]    Chief Complaint   Patient presents with     RECHECK       [place] Medical Record Number:  [id number]  Place of Service where encounter took place:  [facility] ([facility]) [[id number]]    HPI:    Benson Kapoor is a 89 year old  (5/20/1925), who is being seen today for an episodic care visit.  HPI information obtained from: facility chart records, facility staff, patient report and [facility] chart review. Today's concern is:    Nursing reports that patient has had increase agitation at bedtime and sleep is poor despite trazodone   She had a fall in the middle of the night last night self transferring   today she tells me that her left side hurts \"i wish I would just die\"      REVIEW OF SYSTEMS:  Limited secondary to cognitive impairment but today pt reports left side pain     /68   Pulse 74   Temp 97.4  F (36.3  C)   Resp 20   SpO2 97%   GENERAL APPEARANCE:  Alert, moderate distress  RESP:  respiratory effort and palpation of chest normal, auscultation of lungs clear , no respiratory distress  CV:  Palpation and auscultation of heart done , rate and rhythm WNL, no murmur, no peripheral edema  ABDOMEN:  normal bowel sounds, soft, nontender, no hepatosplenomegaly or other masses  M/S:   Gait and station wheelchairbound, Digits and nails intact   Tenderness in left lower posterior chest wall, complains of pain with deep breaths   SKIN:  Inspection and Palpation of skin and subcutaneous tissue   NEURO: 2-12 in normal limits and at patient's baseline  PSYCH:  insight and judgement, memory impaired , affect and mood normal      ASSESSMENT/PLAN:     Left-sided chest wall pain  Fall, initial encounter  Late onset Alzheimer's disease without behavioral disturbance  Chronic respiratory failure, unspecified whether with hypoxia or hypercapnia (H)  Chronic bronchitis, " unspecified chronic bronchitis type (H)     Will obtain a chest xray to check for rib fracture due to recent fall and complaints of pain.   Morphine given from e-kit and will order morphine solu tabs as has had max dose of tylenol   Will increase trazodone at bedtime for sleep   Labs next week        ORDERS:  1. Morphine sulfate 20 mg/ 1 ml - Give 5 mg po now from E-Kit (Barry Contacted)   2. Morphine Solu-tabs 5 mg every 6 hours prn for pain, shortness of breath  3. Chest x-ray 2 view- r/o rib fracture, fall Stat   Left lower posterior chest view dx: pain  4. Increase trazodone to 100 mg po at HS  5. CBC and BMP next lab day dx: Fall     Total time spent with patient visit at the skilled nursing facility was 35 min including phone call to patient contact and attempted to call all 3 of patients emergency contacts without answer- I have asked nursing to update family.. Greater than 50% of total time spent with counseling and coordinating care due to impaired cognition    Electronically signed by:  Rajni Shah NP      Sincerely,        Rajni Shah NP

## 2019-01-03 NOTE — PROGRESS NOTES
"[place] [facility]    Chief Complaint   Patient presents with     RECHECK       [place] Medical Record Number:  [id number]  Place of Service where encounter took place:  [facility] ([facility]) [[id number]]    HPI:    Benson Kapoor is a 89 year old  (5/20/1925), who is being seen today for an episodic care visit.  HPI information obtained from: facility chart records, facility staff, patient report and [facility] chart review. Today's concern is:    Nursing reports that patient has had increase agitation at bedtime and sleep is poor despite trazodone   She had a fall in the middle of the night last night self transferring   today she tells me that her left side hurts \"i wish I would just die\"      REVIEW OF SYSTEMS:  Limited secondary to cognitive impairment but today pt reports left side pain     /68   Pulse 74   Temp 97.4  F (36.3  C)   Resp 20   SpO2 97%   GENERAL APPEARANCE:  Alert, moderate distress  RESP:  respiratory effort and palpation of chest normal, auscultation of lungs clear , no respiratory distress  CV:  Palpation and auscultation of heart done , rate and rhythm WNL, no murmur, no peripheral edema  ABDOMEN:  normal bowel sounds, soft, nontender, no hepatosplenomegaly or other masses  M/S:   Gait and station wheelchairbound, Digits and nails intact   Tenderness in left lower posterior chest wall, complains of pain with deep breaths   SKIN:  Inspection and Palpation of skin and subcutaneous tissue   NEURO: 2-12 in normal limits and at patient's baseline  PSYCH:  insight and judgement, memory impaired , affect and mood normal      ASSESSMENT/PLAN:     Left-sided chest wall pain  Fall, initial encounter  Late onset Alzheimer's disease without behavioral disturbance  Chronic respiratory failure, unspecified whether with hypoxia or hypercapnia (H)  Chronic bronchitis, unspecified chronic bronchitis type (H)     Will obtain a chest xray to check for rib fracture due to recent fall " and complaints of pain.   Morphine given from e-kit and will order morphine solu tabs as has had max dose of tylenol   Will increase trazodone at bedtime for sleep   Labs next week        ORDERS:  1. Morphine sulfate 20 mg/ 1 ml - Give 5 mg po now from E-Kit (Barry Contacted)   2. Morphine Solu-tabs 5 mg every 6 hours prn for pain, shortness of breath  3. Chest x-ray 2 view- r/o rib fracture, fall Stat   Left lower posterior chest view dx: pain  4. Increase trazodone to 100 mg po at HS  5. CBC and BMP next lab day dx: Fall     Total time spent with patient visit at the skilled nursing facility was 35 min including phone call to patient contact and attempted to call all 3 of patients emergency contacts without answer- I have asked nursing to update family.. Greater than 50% of total time spent with counseling and coordinating care due to impaired cognition    Electronically signed by:  Rajni Shah NP

## 2019-01-05 PROBLEM — J96.10 CHRONIC RESPIRATORY FAILURE, UNSPECIFIED WHETHER WITH HYPOXIA OR HYPERCAPNIA (H): Status: ACTIVE | Noted: 2019-01-01

## 2019-01-08 NOTE — LETTER
1/8/2019        RE: Benson Kapoor  [facility]  [address] [facility] [address]        [place] [facility]    Chief Complaint   Patient presents with     shelter Acute       [facility] Medical Record Number:  [id number]  Place of Service where encounter took place:  [facility] ([facility]) [[id number]]    HPI:    Benson Kapoor is a 89 year old  (5/20/1925), who is being seen today for an episodic care visit.  HPI information obtained from: facility chart records, facility staff, patient report and family/first contact son report. Today's concern is:    Patient had increased confusion low grade fever and increased purlent cough over the weekend   Chest xray obtained and was negative for pneumonia   Patient continue to report pain and tenderness in left chest wall since her fall last week   She has yet to receive tramadol for this as pharmacy did not receive RX   She is getting tylenol   She tells me that she is not sure why she is still alive and wishes she would just die  She was started on mucinex     REVIEW OF SYSTEMS:  Limited secondary to cognitive impairment but today pt reports left sided chest pain     /60   Pulse 86   Temp 97.8  F (36.6  C)   Resp 22   Wt 62.4 kg (137 lb 8 oz)   SpO2 96%   BMI 22.19 kg/m     GENERAL APPEARANCE:  Alert, in no distress  Sitting in wheelchair with son present at time of visit   RESP:  respiratory effort and palpation of chest normal, auscultation of lungs diminished throughout , no respiratory distress  CV:  Palpation and auscultation of heart done , rate and rhythm WNL, no murmur, trace peripheral edema  ABDOMEN:  normal bowel sounds, soft, nontender, no hepatosplenomegaly or other masses  M/S:   Gait and station wheelchair bound, Digits and nails intact  SKIN:  Inspection and Palpation of skin and subcutaneous tissue   NEURO: 2-12 in normal limits and at patient's baseline  PSYCH:  insight and judgement, memory impaired ,  affect and mood normal      Hospital Laboratory on 10/11/2018   Component Date Value Ref Range Status     Sodium 10/11/2018 140  133 - 144 mmol/L Final     Potassium 10/11/2018 3.8  3.4 - 5.3 mmol/L Final     Chloride 10/11/2018 99  94 - 109 mmol/L Final     Carbon Dioxide 10/11/2018 38* 20 - 32 mmol/L Final     Anion Gap 10/11/2018 3  3 - 14 mmol/L Final     Glucose 10/11/2018 76  70 - 99 mg/dL Final     Urea Nitrogen 10/11/2018 13  7 - 30 mg/dL Final     Creatinine 10/11/2018 0.63  0.52 - 1.04 mg/dL Final     GFR Estimate 10/11/2018 88  >60 mL/min/1.7m2 Final    Non  GFR Calc     GFR Estimate If Black 10/11/2018 >90  >60 mL/min/1.7m2 Final    African American GFR Calc     Calcium 10/11/2018 8.7  8.5 - 10.1 mg/dL Final     WBC 10/11/2018 6.8  4.0 - 11.0 10e9/L Final     RBC Count 10/11/2018 4.11  3.8 - 5.2 10e12/L Final     Hemoglobin 10/11/2018 12.5  11.7 - 15.7 g/dL Final     Hematocrit 10/11/2018 41.9  35.0 - 47.0 % Final     MCV 10/11/2018 102* 78 - 100 fl Final     MCH 10/11/2018 30.4  26.5 - 33.0 pg Final     MCHC 10/11/2018 29.8* 31.5 - 36.5 g/dL Final     RDW 10/11/2018 13.5  10.0 - 15.0 % Final     Platelet Count 10/11/2018 208  150 - 450 10e9/L Final               ASSESSMENT/PLAN:  COPD exacerbation (H)  Will start prednisone 40 mg PO daily for 5 days   Start doxycycline due to increased purulent cough   Continue inhalers as ordered   Monitor closely     Left-sided chest wall pain  Suspect related to fall as CXR was negative     Fall, subsequent encounter      Intermittent atrial fibrillation (H)  Not on rate control agent or anti coagulant       (HFpEF) heart failure with preserved ejection fraction (H)  Weight has been stable and in fact is trending downward doubt cough is related to this     Late onset Alzheimer's disease without behavioral disturbance  Makes obtaining HPI difficult   She is at baseline   Increased confusion and insomnia at HS per nursing   Trazodone was increased last  week   Make be difficult to get clear picture of effectiveness now with starting prednisone        Orders:  1. Doxycycline 100mg PO BID x7 days (COPD)  2. Prednisone 40mg daily AM x5 days  3. Continue Mucinex 600mg PO BID x1 week      Total time spent with patient visit at the [facility] nursing facility was 35 min including patient visit, review of past records and face to face visit with son. Greater than 50% of total time spent with counseling and coordinating care due to impaired cognition    Electronically signed by:  Rajni Shah NP      Sincerely,        Rajni Shah NP

## 2019-01-08 NOTE — PROGRESS NOTES
[place] [facility]    Chief Complaint   Patient presents with     assisted Acute       [place] Medical Record Number:  [id number]  Place of Service where encounter took place:  [facility] ([facility]) [[id number]]    HPI:    Benson Kapoor is a 89 year old  (5/20/1925), who is being seen today for an episodic care visit.  HPI information obtained from: facility chart records, facility staff, patient report and family/first contact son report. Today's concern is:    Patient had increased confusion low grade fever and increased purlent cough over the weekend   Chest xray obtained and was negative for pneumonia   Patient continue to report pain and tenderness in left chest wall since her fall last week   She has yet to receive tramadol for this as pharmacy did not receive RX   She is getting tylenol   She tells me that she is not sure why she is still alive and wishes she would just die  She was started on mucinex     REVIEW OF SYSTEMS:  Limited secondary to cognitive impairment but today pt reports left sided chest pain     /60   Pulse 86   Temp 97.8  F (36.6  C)   Resp 22   Wt 62.4 kg (137 lb 8 oz)   SpO2 96%   BMI 22.19 kg/m    GENERAL APPEARANCE:  Alert, in no distress  Sitting in wheelchair with son present at time of visit   RESP:  respiratory effort and palpation of chest normal, auscultation of lungs diminished throughout , no respiratory distress  CV:  Palpation and auscultation of heart done , rate and rhythm WNL, no murmur, trace peripheral edema  ABDOMEN:  normal bowel sounds, soft, nontender, no hepatosplenomegaly or other masses  M/S:   Gait and station wheelchair bound, Digits and nails intact  SKIN:  Inspection and Palpation of skin and subcutaneous tissue   NEURO: 2-12 in normal limits and at patient's baseline  PSYCH:  insight and judgement, memory impaired , affect and mood normal      Hospital Laboratory on 10/11/2018   Component Date Value Ref Range Status     Sodium  10/11/2018 140  133 - 144 mmol/L Final     Potassium 10/11/2018 3.8  3.4 - 5.3 mmol/L Final     Chloride 10/11/2018 99  94 - 109 mmol/L Final     Carbon Dioxide 10/11/2018 38* 20 - 32 mmol/L Final     Anion Gap 10/11/2018 3  3 - 14 mmol/L Final     Glucose 10/11/2018 76  70 - 99 mg/dL Final     Urea Nitrogen 10/11/2018 13  7 - 30 mg/dL Final     Creatinine 10/11/2018 0.63  0.52 - 1.04 mg/dL Final     GFR Estimate 10/11/2018 88  >60 mL/min/1.7m2 Final    Non  GFR Calc     GFR Estimate If Black 10/11/2018 >90  >60 mL/min/1.7m2 Final    African American GFR Calc     Calcium 10/11/2018 8.7  8.5 - 10.1 mg/dL Final     WBC 10/11/2018 6.8  4.0 - 11.0 10e9/L Final     RBC Count 10/11/2018 4.11  3.8 - 5.2 10e12/L Final     Hemoglobin 10/11/2018 12.5  11.7 - 15.7 g/dL Final     Hematocrit 10/11/2018 41.9  35.0 - 47.0 % Final     MCV 10/11/2018 102* 78 - 100 fl Final     MCH 10/11/2018 30.4  26.5 - 33.0 pg Final     MCHC 10/11/2018 29.8* 31.5 - 36.5 g/dL Final     RDW 10/11/2018 13.5  10.0 - 15.0 % Final     Platelet Count 10/11/2018 208  150 - 450 10e9/L Final               ASSESSMENT/PLAN:  COPD exacerbation (H)  Will start prednisone 40 mg PO daily for 5 days   Start doxycycline due to increased purulent cough   Continue inhalers as ordered   Monitor closely     Left-sided chest wall pain  Suspect related to fall as CXR was negative     Fall, subsequent encounter      Intermittent atrial fibrillation (H)  Not on rate control agent or anti coagulant       (HFpEF) heart failure with preserved ejection fraction (H)  Weight has been stable and in fact is trending downward doubt cough is related to this     Late onset Alzheimer's disease without behavioral disturbance  Makes obtaining HPI difficult   She is at baseline   Increased confusion and insomnia at HS per nursing   Trazodone was increased last week   Make be difficult to get clear picture of effectiveness now with starting prednisone        Orders:  1.  Doxycycline 100mg PO BID x7 days (COPD)  2. Prednisone 40mg daily AM x5 days  3. Continue Mucinex 600mg PO BID x1 week      Total time spent with patient visit at the skilled nursing facility was 35 min including patient visit, review of past records and face to face visit with son. Greater than 50% of total time spent with counseling and coordinating care due to impaired cognition    Electronically signed by:  Rajni Shah NP

## 2019-01-15 NOTE — PROGRESS NOTES
[place] [facility]    Chief Complaint   Patient presents with     BENJAMÍN       [place] Medical Record Number:  [id number]  Place of Service where encounter took place:  [facility] ([facility]) [[id number]]    HPI:    Benson Kapoor is a 89 year old  (5/20/1925), who is being seen today for an episodic care visit.  HPI information obtained from: facility chart records, facility staff and patient report. Today's concern is:    Staff has noticed increased edema in arms (which are also erythematous), mild in leg- has new skin tears on left legs all of which was noted last night   Staff also noted that appears much more fatigued   Bedtime continues to be difficult with patient having increased agitation, not wanting to be left alone  Staff continues to offer support and sits with patient, distraction   She is on trazodone at    Staff notes behaviors are the same and unchanged      She continues to report pain in left side of chest where she fell- has used tramadol- mild improvement noted     No confusion today   She is sitting room with son Taras- noted that she has been declining all activities- she reports that she has a hard time getting down there with her wheelchair because of the foot pedal but will still decline when offered push there     Her weight has noted to be decreased in PCC   In October she was 151 lbs   Current weight is 137 lbs       REVIEW OF SYSTEMS:  Unobtainable secondary to cognitive impairment.     /72   Pulse 85   Temp 97.3  F (36.3  C)   Resp 20   Wt 62.4 kg (137 lb 8 oz)   SpO2 95%   BMI 22.19 kg/m    GENERAL APPEARANCE:  Alert, in no distress  RESP:  respiratory effort and palpation of chest normal, auscultation of lungs diminished throughout, no respiratory distress  CV:  Palpation and auscultation of heart done , rate and rhythm irregular, no murmur, 2+ peripheral edema in bilateral arms from mid arm up to upper arm, both arms are also erythematous, trace robyn  noted in bilateral lower extremities   ABDOMEN:  normal bowel sounds, soft, nontender, no hepatosplenomegaly or other masses  M/S:   Gait and station wheelchair bound, Digits and nails intact  SKIN:  Inspection and Palpation of skin and subcutaneous tissue   NEURO: 2-12 in normal limits and at patient's baseline  PSYCH:  insight and judgement, memory impaired , affect and mood normal      Hospital Laboratory on 01/10/2019   Component Date Value Ref Range Status     Sodium 01/10/2019 140  133 - 144 mmol/L Final     Potassium 01/10/2019 4.7  3.4 - 5.3 mmol/L Final     Chloride 01/10/2019 101  94 - 109 mmol/L Final     Carbon Dioxide 01/10/2019 33* 20 - 32 mmol/L Final     Anion Gap 01/10/2019 6  3 - 14 mmol/L Final     Glucose 01/10/2019 109* 70 - 99 mg/dL Final     Urea Nitrogen 01/10/2019 21  7 - 30 mg/dL Final     Creatinine 01/10/2019 0.63  0.52 - 1.04 mg/dL Final     GFR Estimate 01/10/2019 77  >60 mL/min/[1.73_m2] Final    Comment: Non  GFR Calc  Starting 12/18/2018, serum creatinine based estimated GFR (eGFR) will be   calculated using the Chronic Kidney Disease Epidemiology Collaboration   (CKD-EPI) equation.       GFR Estimate If Black 01/10/2019 89  >60 mL/min/[1.73_m2] Final    Comment:  GFR Calc  Starting 12/18/2018, serum creatinine based estimated GFR (eGFR) will be   calculated using the Chronic Kidney Disease Epidemiology Collaboration   (CKD-EPI) equation.       Calcium 01/10/2019 8.6  8.5 - 10.1 mg/dL Final     WBC 01/10/2019 8.1  4.0 - 11.0 10e9/L Final     RBC Count 01/10/2019 3.80  3.8 - 5.2 10e12/L Final     Hemoglobin 01/10/2019 11.6* 11.7 - 15.7 g/dL Final     Hematocrit 01/10/2019 37.8  35.0 - 47.0 % Final     MCV 01/10/2019 100  78 - 100 fl Final     MCH 01/10/2019 30.5  26.5 - 33.0 pg Final     MCHC 01/10/2019 30.7* 31.5 - 36.5 g/dL Final     RDW 01/10/2019 14.0  10.0 - 15.0 % Final     Platelet Count 01/10/2019 237  150 - 450 10e9/L Final          ASSESSMENT/PLAN:  (HFpEF) heart failure with preserved ejection fraction (H)/Peripheral edema  Echocardiogram done 10/18 showed normal EF 60-65%, no LVH  There was a mild elevation of right arterial pressure   Incidentally noted to be in a fib rate controlled     She has responded well to lasix   Now with worsening edema, only in her upper extremities- ?possible thoracic malignancy or superior vena cava syndrome (no edema noted in face or neck)     Goal of care continue to comfort focused due to advanced age and alzheimer's for that reason will not pursure further work up with CT scan rather focus on symptomatic care     Chronic bronchitis, unspecified chronic bronchitis type (H)  Stable     Late onset Alzheimer's disease with behavioral disturbance  Worsening behaviors at HS     Intermittent atrial fibrillation (H)  New diagnosis as of October   Rate controlled without medication   Not on anticoagulant due to advanced age and high risk for bleeding     Loss of weight  Noted  ?malignancy     Persistent insomnia  Despite trazodone   Nursing will continue to support     Fall, subsequent encounter  Continue to report pain in left side  No rib fractures seen on xray   Tenderness noted  Will continue tramadol        Orders:  1. Increase lasix to 20mg BID x3 days and then resume daily dosing of 20mg (CHF/Edma)  2. Increase Potassium to 10 mcq PO BID x3 days and then resume daily dosing of 20 meq  3. Hospice referral placed  4. Resume would care to legs, change Band-Aid daily    Total time spent with patient visit at the skilled nursing facility was 35 min including patient visit, review of past records and phone call to patient contact. Greater than 50% of total time spent with counseling and coordinating care due to impaired cognition.     Electronically signed by:  Rajni Shah NP

## 2019-01-15 NOTE — LETTER
1/15/2019        RE: Benson Kapoor  [facility]  [address] [facility] [address]        [place] [facility]    Chief Complaint   Patient presents with     RECHECK       [place] Medical Record Number:  [id number]  Place of Service where encounter took place:  [facility] ([facility]) [[id number]]    HPI:    Benson Kapoor is a 89 year old  (5/20/1925), who is being seen today for an episodic care visit.  HPI information obtained from: facility chart records, facility staff and patient report. Today's concern is:    Staff has noticed increased edema in arms (which are also erythematous), mild in leg- has new skin tears on left legs all of which was noted last night   Staff also noted that appears much more fatigued   Bedtime continues to be difficult with patient having increased agitation, not wanting to be left alone  Staff continues to offer support and sits with patient, distraction   She is on trazodone at HS   Staff notes behaviors are the same and unchanged      She continues to report pain in left side of chest where she fell- has used tramadol- mild improvement noted     No confusion today   She is sitting room with son Taras- noted that she has been declining all activities- she reports that she has a hard time getting down there with her wheelchair because of the foot pedal but will still decline when offered push there     Her weight has noted to be decreased in PCC   In October she was 151 lbs   Current weight is 137 lbs       REVIEW OF SYSTEMS:  Unobtainable secondary to cognitive impairment.     /72   Pulse 85   Temp 97.3  F (36.3  C)   Resp 20   Wt 62.4 kg (137 lb 8 oz)   SpO2 95%   BMI 22.19 kg/m     GENERAL APPEARANCE:  Alert, in no distress  RESP:  respiratory effort and palpation of chest normal, auscultation of lungs diminished throughout, no respiratory distress  CV:  Palpation and auscultation of heart done , rate and rhythm irregular, no murmur,  2+ peripheral edema in bilateral arms from mid arm up to upper arm, both arms are also erythematous, trace robyn noted in bilateral lower extremities   ABDOMEN:  normal bowel sounds, soft, nontender, no hepatosplenomegaly or other masses  M/S:   Gait and station wheelchair bound, Digits and nails intact  SKIN:  Inspection and Palpation of skin and subcutaneous tissue   NEURO: 2-12 in normal limits and at patient's baseline  PSYCH:  insight and judgement, memory impaired , affect and mood normal      Hospital Laboratory on 01/10/2019   Component Date Value Ref Range Status     Sodium 01/10/2019 140  133 - 144 mmol/L Final     Potassium 01/10/2019 4.7  3.4 - 5.3 mmol/L Final     Chloride 01/10/2019 101  94 - 109 mmol/L Final     Carbon Dioxide 01/10/2019 33* 20 - 32 mmol/L Final     Anion Gap 01/10/2019 6  3 - 14 mmol/L Final     Glucose 01/10/2019 109* 70 - 99 mg/dL Final     Urea Nitrogen 01/10/2019 21  7 - 30 mg/dL Final     Creatinine 01/10/2019 0.63  0.52 - 1.04 mg/dL Final     GFR Estimate 01/10/2019 77  >60 mL/min/[1.73_m2] Final    Comment: Non  GFR Calc  Starting 12/18/2018, serum creatinine based estimated GFR (eGFR) will be   calculated using the Chronic Kidney Disease Epidemiology Collaboration   (CKD-EPI) equation.       GFR Estimate If Black 01/10/2019 89  >60 mL/min/[1.73_m2] Final    Comment:  GFR Calc  Starting 12/18/2018, serum creatinine based estimated GFR (eGFR) will be   calculated using the Chronic Kidney Disease Epidemiology Collaboration   (CKD-EPI) equation.       Calcium 01/10/2019 8.6  8.5 - 10.1 mg/dL Final     WBC 01/10/2019 8.1  4.0 - 11.0 10e9/L Final     RBC Count 01/10/2019 3.80  3.8 - 5.2 10e12/L Final     Hemoglobin 01/10/2019 11.6* 11.7 - 15.7 g/dL Final     Hematocrit 01/10/2019 37.8  35.0 - 47.0 % Final     MCV 01/10/2019 100  78 - 100 fl Final     MCH 01/10/2019 30.5  26.5 - 33.0 pg Final     MCHC 01/10/2019 30.7* 31.5 - 36.5 g/dL Final     RDW  01/10/2019 14.0  10.0 - 15.0 % Final     Platelet Count 01/10/2019 237  150 - 450 10e9/L Final         ASSESSMENT/PLAN:  (HFpEF) heart failure with preserved ejection fraction (H)/Peripheral edema  Echocardiogram done 10/18 showed normal EF 60-65%, no LVH  There was a mild elevation of right arterial pressure   Incidentally noted to be in a fib rate controlled     She has responded well to lasix   Now with worsening edema, only in her upper extremities- ?possible thoracic malignancy or superior vena cava syndrome (no edema noted in face or neck)     Goal of care continue to comfort focused due to advanced age and alzheimer's for that reason will not pursure further work up with CT scan rather focus on symptomatic care     Chronic bronchitis, unspecified chronic bronchitis type (H)  Stable     Late onset Alzheimer's disease with behavioral disturbance  Worsening behaviors at HS     Intermittent atrial fibrillation (H)  New diagnosis as of October   Rate controlled without medication   Not on anticoagulant due to advanced age and high risk for bleeding     Loss of weight  Noted  ?malignancy     Persistent insomnia  Despite trazodone   Nursing will continue to support     Fall, subsequent encounter  Continue to report pain in left side  No rib fractures seen on xray   Tenderness noted  Will continue tramadol        Orders:  1. Increase lasix to 20mg BID x3 days and then resume daily dosing of 20mg (CHF/Edma)  2. Increase Potassium to 10 mcq PO BID x3 days and then resume daily dosing of 20 meq  3. Hospice referral placed  4. Resume would care to legs, change Band-Aid daily    Total time spent with patient visit at the skilled nursing facility was 35 min including patient visit, review of past records and phone call to patient contact. Greater than 50% of total time spent with counseling and coordinating care due to impaired cognition.     Electronically signed by:  Rajni Shah NP      Sincerely,        Rajni  ENRICO Shah

## 2019-02-13 NOTE — PROGRESS NOTES
[place] [facility]    Chief Complaint   Patient presents with     alf Regulatory       [place] Medical Record Number:  [id number]  Place of Service where encounter took place:  [facility] (Critical access hospital) [[id number]]    HPI:    Benson Kapoor is a 89 year old  (5/20/1925), who is being seen today for a federally mandated E/M visit.  HPI information obtained from: facility chart records, facility staff, patient report and [place] Epic chart review. Benson Kapoor is a 89 year old  (5/20/1925), who is being seen today for an episodic care visit.  HPI information obtained from: facility chart records, facility staff and patient report.  Today's concerns are:  {Critical access hospital DX:684889}    ALLERGIES: Patient has no known allergies.  PAST MEDICAL HISTORY:  has a past medical history of A fib, Anemia due to blood loss, acute (9/5/2012), DJD (degenerative joint disease), and HTN.  PAST SURGICAL HISTORY:  has a past surgical history that includes surgical history of -  and hysterectomy, cervix status unknown.  FAMILY HISTORY: family history is not on file.  SOCIAL HISTORY:  reports that  has never smoked. she has never used smokeless tobacco. She reports that she does not drink alcohol or use drugs.    MEDICATIONS:  Current Outpatient Medications   Medication Sig Dispense Refill     acetaminophen (TYLENOL EX ST ARTHRITIS PAIN) 500 MG tablet Take 650 mg by mouth 3 times daily        albuterol (2.5 MG/3ML) 0.083% neb solution Take 1 vial by nebulization every 6 hours as needed        alum & mag hydroxide-simethicone (MAALOX REGULAR STRENGTH) 200-200-20 MG/5ML SUSP suspension Take 30 mLs by mouth every 2 hours as needed for indigestion       cholecalciferol (VITAMIN  -D) 1000 units capsule Take 1 capsule by mouth daily       Escitalopram Oxalate (LEXAPRO PO) Take 5 mg by mouth daily       Furosemide (LASIX PO) Take 20 mg by mouth 2 times daily For 3 days then resume daily 20mg dosing       ipratropium - albuterol 0.5  mg/2.5 mg/3 mL (DUONEB) 0.5-2.5 (3) MG/3ML neb solution Take 1 vial by nebulization 4 times daily  90 vial 1     LORazepam (ATIVAN) 0.5 MG tablet Take 0.5 mg by mouth every 4 hours as needed for anxiety       LORazepam (ATIVAN) 0.5 MG tablet Take 0.5 mg by mouth At Bedtime       morphine 5 MG solu-tab Take 5 mg by mouth every 2 hours as needed for shortness of breath / dyspnea or moderate to severe pain       morphine 5 MG solu-tab Take 5 mg by mouth 2 times daily       Nutritional Supplements (NUTRITIONAL DRINK PO) 4oz BID       potassium chloride (MICRO-K) 10 MEQ CR capsule Take 20 mEq by mouth 2 times daily For 3 days ten resume daily dosing of 20mcg       sennosides (SENOKOT) 8.6 MG tablet Take 1 tablet by mouth 2 times daily as needed        TRAZODONE HCL PO Take 100 mg by mouth At Bedtime 100mg by mouth at 10pm       triamcinolone (ARISTOCORT HP) 0.5 % external cream Apply topically daily as needed for irritation Apply to bilateral legs       DOXYCYCLINE CALCIUM PO Take 100 mg by mouth 2 times daily For 7 days       traMADol (ULTRAM) 50 MG tablet Take 1 tablet (50 mg) by mouth every 6 hours as needed for severe pain For 2 weeks 20 tablet 0     Medications reviewed:  Medications reconciled to facility chart and changes were made to reflect current medications as identified as above med list. Below are the changes that were made:   Medications stopped since last EPIC medication reconciliation:   There are no discontinued medications.    Medications started since last [facility] medication reconciliation:  No orders of the defined types were placed in this encounter.    This SmartLink has not been configured with any valid records.     Case Management:  I have reviewed the care plan and MDS and do agree with the plan. Patient's desire to return to the community is {[facility] RETURN TO COMMUNITY:903535}.  Information reviewed:  Medications, vital signs, orders, and nursing notes.    ROS:  {ROS [facility]:143515}    Exam:  Vitals: BP  "142/78   Pulse 78   Temp 98.4  F (36.9  C)   Resp 18   Ht 1.676 m (5' 6\")   Wt 62.2 kg (137 lb 3.2 oz)   SpO2 95%   BMI 22.14 kg/m    BMI= Body mass index is 22.14 kg/m .  {Nursing home physical exam :252672}    Lab/Diagnostic data:     CBC RESULTS:   Recent Labs   Lab Test 01/10/19  0750 10/11/18  0724   WBC 8.1 6.8   RBC 3.80 4.11   HGB 11.6* 12.5   HCT 37.8 41.9    102*   MCH 30.5 30.4   MCHC 30.7* 29.8*   RDW 14.0 13.5    208       Last Basic Metabolic Panel:  Recent Labs   Lab Test 01/10/19  0750 10/11/18  0724    140   POTASSIUM 4.7 3.8   CHLORIDE 101 99   OG 8.6 8.7   CO2 33* 38*   BUN 21 13   CR 0.63 0.63   * 76       Liver Function Studies -   Recent Labs   Lab Test 03/22/12  0920 12/26/11  1135   PROTTOTAL 7.0 7.6   ALBUMIN 3.6 4.0   BILITOTAL 0.4 0.5   ALKPHOS 78 76   AST 27 29   ALT 9 12       ASSESSMENT/PLAN  {[facility] DX:903703}    Orders:  ***    {[facility] TIME SPENT:120222}    Electronically signed by:  Vanda Quinteros    "

## 2019-02-14 NOTE — LETTER
2/14/2019        RE: Benson Kapoor  [facility]  [address] [facility] [address]          [place] [facility]    Chief Complaint   Patient presents with     senior living Regulatory       [place] Medical Record Number:  [id number]  Place of Service where encounter took place:  [facility] ([facility]) [[id number]]    HPI:    Benson Kapoor is a 89 year old  (5/20/1925), who is being seen today for a federally mandated E/M visit.  HPI information obtained from: facility chart records, facility staff, patient report and [facility] chart review. Benson Kapoor is a 89 year old  (5/20/1925), who is being seen today for an episodic care visit.  HPI information obtained from: facility chart records, facility staff and patient report.  Today's concerns are:  Patient is on hospice   Her edema has been well controlled   She continue to complain of shortness of breath worse at bedtime   Hospice has started lorazepam at HS and every 4 hours as needed   She has only utilized one as needed dose in 1 week     She denies any concerns except reports that she has pain in left arm and tries not to do to much with it   She is on scheduled and as needed     ALLERGIES: Patient has no known allergies.  PAST MEDICAL HISTORY:  has a past medical history of A fib, Anemia due to blood loss, acute (9/5/2012), DJD (degenerative joint disease), and HTN.  PAST SURGICAL HISTORY:  has a past surgical history that includes surgical history of -  and hysterectomy, cervix status unknown.  FAMILY HISTORY: family history is not on file.  SOCIAL HISTORY:  reports that  has never smoked. she has never used smokeless tobacco. She reports that she does not drink alcohol or use drugs.    MEDICATIONS:  Current Outpatient Medications   Medication Sig Dispense Refill     acetaminophen (TYLENOL EX ST ARTHRITIS PAIN) 500 MG tablet Take 650 mg by mouth 3 times daily        albuterol (2.5 MG/3ML) 0.083% neb solution Take 1 vial  by nebulization every 6 hours as needed        alum & mag hydroxide-simethicone (MAALOX REGULAR STRENGTH) 200-200-20 MG/5ML SUSP suspension Take 30 mLs by mouth every 2 hours as needed for indigestion       cholecalciferol (VITAMIN  -D) 1000 units capsule Take 1 capsule by mouth daily       Escitalopram Oxalate (LEXAPRO PO) Take 5 mg by mouth daily       Furosemide (LASIX PO) Take 20 mg by mouth 2 times daily For 3 days then resume daily 20mg dosing       ipratropium - albuterol 0.5 mg/2.5 mg/3 mL (DUONEB) 0.5-2.5 (3) MG/3ML neb solution Take 1 vial by nebulization 4 times daily  90 vial 1     LORazepam (ATIVAN) 0.5 MG tablet Take 0.5 mg by mouth every 4 hours as needed for anxiety       LORazepam (ATIVAN) 0.5 MG tablet Take 0.5 mg by mouth At Bedtime       morphine 5 MG solu-tab Take 5 mg by mouth every 2 hours as needed for shortness of breath / dyspnea or moderate to severe pain       morphine 5 MG solu-tab Take 5 mg by mouth 2 times daily       Nutritional Supplements (NUTRITIONAL DRINK PO) 4oz BID       potassium chloride (MICRO-K) 10 MEQ CR capsule Take 20 mEq by mouth 2 times daily For 3 days ten resume daily dosing of 20mcg       sennosides (SENOKOT) 8.6 MG tablet Take 1 tablet by mouth 2 times daily as needed        TRAZODONE HCL PO Take 100 mg by mouth At Bedtime 100mg by mouth at 10pm       triamcinolone (ARISTOCORT HP) 0.5 % external cream Apply topically daily as needed for irritation Apply to bilateral legs       Medications reviewed:  Medications reconciled to facility chart and changes were made to reflect current medications as identified as above med list. Below are the changes that were made:   Medications stopped since last EPIC medication reconciliation:   There are no discontinued medications.    Medications started since last [facility] medication reconciliation:  No orders of the defined types were placed in this encounter.    This SmartLink has not been configured with any valid records.     Case  "Management:  I have reviewed the care plan and MDS and do agree with the plan. Patient's desire to return to the community is present, but is not able due to care needs .  Information reviewed:  Medications, vital signs, orders, and nursing notes.    ROS:  Limited secondary to cognitive impairment but today pt reports left arm pain     Exam:  Vitals: /78   Pulse 78   Temp 98.4  F (36.9  C)   Resp 18   Ht 1.676 m (5' 6\")   Wt 62.2 kg (137 lb 3.2 oz)   SpO2 95%   BMI 22.14 kg/m     BMI= Body mass index is 22.14 kg/m .  GENERAL APPEARANCE:  Alert, in no distress  RESP:  respiratory effort and palpation of chest normal, lungs clear to auscultation , no respiratory distress  CV:  Palpation and auscultation of heart done , regular rate and rhythm, no murmur, rub, or gallop  ABDOMEN:  normal bowel sounds, soft, nontender, no hepatosplenomegaly or other masses  LYMPHATICS:  No adenopathy in neck   SKIN:  Inspection of skin and subcutaneous tissue baseline, Palpation of skin and subcutaneous tissue baseline  NEURO:   Cranial nerves 2-12 are normal tested and grossly at patient's baseline  PSYCH:  insight and judgement impaired, memory impaired     Lab/Diagnostic data:     CBC RESULTS:   Recent Labs   Lab Test 01/10/19  0750 10/11/18  0724   WBC 8.1 6.8   RBC 3.80 4.11   HGB 11.6* 12.5   HCT 37.8 41.9    102*   MCH 30.5 30.4   MCHC 30.7* 29.8*   RDW 14.0 13.5    208       Last Basic Metabolic Panel:  Recent Labs   Lab Test 01/10/19  0750 10/11/18  0724    140   POTASSIUM 4.7 3.8   CHLORIDE 101 99   OG 8.6 8.7   CO2 33* 38*   BUN 21 13   CR 0.63 0.63   * 76       Liver Function Studies -   Recent Labs   Lab Test 03/22/12  0920 12/26/11  1135   PROTTOTAL 7.0 7.6   ALBUMIN 3.6 4.0   BILITOTAL 0.4 0.5   ALKPHOS 78 76   AST 27 29   ALT 9 12       ASSESSMENT/PLAN  1. (HFpEF) heart failure with preserved ejection fraction (H)    2. Peripheral edema    3. Chronic bronchitis, unspecified " chronic bronchitis type (H)    4. Late onset Alzheimer's disease with behavioral disturbance    5. Intermittent atrial fibrillation (H)    6. Hospice care patient        Patient is doing well no concerns   Appreciate hospice assistance in symptoms management     Orders:  Continue as needed lorazepam for anxiety and shortness of breath     Total time spent with patient visit at the skilled nursing facility was 25 min including patient visit and review of past records. Greater than 50% of total time spent with counseling and coordinating care due to impared cognition.    Electronically signed by:  Rajni Shah NP        Sincerely,        Rajni Shah NP

## 2019-02-14 NOTE — PROGRESS NOTES
[place] [facility]    Chief Complaint   Patient presents with     FCI Regulatory       [place] Medical Record Number:  [id number]  Place of Service where encounter took place:  [facility] ([facility]) [[id number]]    HPI:    Benson Kapoor is a 89 year old  (5/20/1925), who is being seen today for a federally mandated E/M visit.  HPI information obtained from: facility chart records, facility staff, patient report and [place] Epic chart review. Benson Kapoor is a 89 year old  (5/20/1925), who is being seen today for an episodic care visit.  HPI information obtained from: facility chart records, facility staff and patient report.  Today's concerns are:  Patient is on hospice   Her edema has been well controlled   She continue to complain of shortness of breath worse at bedtime   Hospice has started lorazepam at HS and every 4 hours as needed   She has only utilized one as needed dose in 1 week     She denies any concerns except reports that she has pain in left arm and tries not to do to much with it   She is on scheduled and as needed     ALLERGIES: Patient has no known allergies.  PAST MEDICAL HISTORY:  has a past medical history of A fib, Anemia due to blood loss, acute (9/5/2012), DJD (degenerative joint disease), and HTN.  PAST SURGICAL HISTORY:  has a past surgical history that includes surgical history of -  and hysterectomy, cervix status unknown.  FAMILY HISTORY: family history is not on file.  SOCIAL HISTORY:  reports that  has never smoked. she has never used smokeless tobacco. She reports that she does not drink alcohol or use drugs.    MEDICATIONS:  Current Outpatient Medications   Medication Sig Dispense Refill     acetaminophen (TYLENOL EX ST ARTHRITIS PAIN) 500 MG tablet Take 650 mg by mouth 3 times daily        albuterol (2.5 MG/3ML) 0.083% neb solution Take 1 vial by nebulization every 6 hours as needed        alum & mag hydroxide-simethicone (MAALOX REGULAR STRENGTH)  200-200-20 MG/5ML SUSP suspension Take 30 mLs by mouth every 2 hours as needed for indigestion       cholecalciferol (VITAMIN  -D) 1000 units capsule Take 1 capsule by mouth daily       Escitalopram Oxalate (LEXAPRO PO) Take 5 mg by mouth daily       Furosemide (LASIX PO) Take 20 mg by mouth 2 times daily For 3 days then resume daily 20mg dosing       ipratropium - albuterol 0.5 mg/2.5 mg/3 mL (DUONEB) 0.5-2.5 (3) MG/3ML neb solution Take 1 vial by nebulization 4 times daily  90 vial 1     LORazepam (ATIVAN) 0.5 MG tablet Take 0.5 mg by mouth every 4 hours as needed for anxiety       LORazepam (ATIVAN) 0.5 MG tablet Take 0.5 mg by mouth At Bedtime       morphine 5 MG solu-tab Take 5 mg by mouth every 2 hours as needed for shortness of breath / dyspnea or moderate to severe pain       morphine 5 MG solu-tab Take 5 mg by mouth 2 times daily       Nutritional Supplements (NUTRITIONAL DRINK PO) 4oz BID       potassium chloride (MICRO-K) 10 MEQ CR capsule Take 20 mEq by mouth 2 times daily For 3 days ten resume daily dosing of 20mcg       sennosides (SENOKOT) 8.6 MG tablet Take 1 tablet by mouth 2 times daily as needed        TRAZODONE HCL PO Take 100 mg by mouth At Bedtime 100mg by mouth at 10pm       triamcinolone (ARISTOCORT HP) 0.5 % external cream Apply topically daily as needed for irritation Apply to bilateral legs       Medications reviewed:  Medications reconciled to facility chart and changes were made to reflect current medications as identified as above med list. Below are the changes that were made:   Medications stopped since last EPIC medication reconciliation:   There are no discontinued medications.    Medications started since last [facility] medication reconciliation:  No orders of the defined types were placed in this encounter.    This SmartLink has not been configured with any valid records.     Case Management:  I have reviewed the care plan and MDS and do agree with the plan. Patient's desire to return  "to the community is present, but is not able due to care needs .  Information reviewed:  Medications, vital signs, orders, and nursing notes.    ROS:  Limited secondary to cognitive impairment but today pt reports left arm pain     Exam:  Vitals: /78   Pulse 78   Temp 98.4  F (36.9  C)   Resp 18   Ht 1.676 m (5' 6\")   Wt 62.2 kg (137 lb 3.2 oz)   SpO2 95%   BMI 22.14 kg/m    BMI= Body mass index is 22.14 kg/m .  GENERAL APPEARANCE:  Alert, in no distress  RESP:  respiratory effort and palpation of chest normal, lungs clear to auscultation , no respiratory distress  CV:  Palpation and auscultation of heart done , regular rate and rhythm, no murmur, rub, or gallop  ABDOMEN:  normal bowel sounds, soft, nontender, no hepatosplenomegaly or other masses  LYMPHATICS:  No adenopathy in neck   SKIN:  Inspection of skin and subcutaneous tissue baseline, Palpation of skin and subcutaneous tissue baseline  NEURO:   Cranial nerves 2-12 are normal tested and grossly at patient's baseline  PSYCH:  insight and judgement impaired, memory impaired     Lab/Diagnostic data:     CBC RESULTS:   Recent Labs   Lab Test 01/10/19  0750 10/11/18  0724   WBC 8.1 6.8   RBC 3.80 4.11   HGB 11.6* 12.5   HCT 37.8 41.9    102*   MCH 30.5 30.4   MCHC 30.7* 29.8*   RDW 14.0 13.5    208       Last Basic Metabolic Panel:  Recent Labs   Lab Test 01/10/19  0750 10/11/18  0724    140   POTASSIUM 4.7 3.8   CHLORIDE 101 99   OG 8.6 8.7   CO2 33* 38*   BUN 21 13   CR 0.63 0.63   * 76       Liver Function Studies -   Recent Labs   Lab Test 03/22/12  0920 12/26/11  1135   PROTTOTAL 7.0 7.6   ALBUMIN 3.6 4.0   BILITOTAL 0.4 0.5   ALKPHOS 78 76   AST 27 29   ALT 9 12       ASSESSMENT/PLAN  1. (HFpEF) heart failure with preserved ejection fraction (H)    2. Peripheral edema    3. Chronic bronchitis, unspecified chronic bronchitis type (H)    4. Late onset Alzheimer's disease with behavioral disturbance    5. Intermittent " atrial fibrillation (H)    6. Hospice care patient        Patient is doing well no concerns   Appreciate hospice assistance in symptoms management     Orders:  Continue as needed lorazepam for anxiety and shortness of breath     Total time spent with patient visit at the skilled nursing facility was 25 min including patient visit and review of past records. Greater than 50% of total time spent with counseling and coordinating care due to impared cognition.    Electronically signed by:  Rajni Shah NP

## 2019-04-15 NOTE — PROGRESS NOTES
[facility]  Chief Complaint   Patient presents with     prison Regulatory     [place] Medical Record Number:  [id number]  Place of Service where encounter took place:  [facility] (S) [[id number]]      HPI:    Benson Kapoor  is 89 year old (5/20/1925), who is being seen today for a federally mandated E/M visit.  HPI information obtained from: facility staff, patient report and [facility] chart review.     Today's concerns are:  -  - Resident seen and examined.   - Reports  breathing is hard, uses O2 all the time, no wheezing, cough at baseline.   - reports she wishes if she is 7 feet down, added no reason to live longer. Regarding appetite, reports eats what she gets, sleep is ok. Denies pain or wheezing. Reports enjoys watching TV  - RN reports resident yells out a lot. Family does visit her daily. DON reports Resident;s memory is progressing.   - GNP reports HoTN, lasix stopped  --------------------------------  - - Past Medical, social, family histories, medications, and allergies reviewed and updated  - Medications reviewed: in the chart and EHR.   - Case Management:   I have reviewed the care plan and MDS and do agree with the plan. Patient's desire to return to the community is not present.  Information reviewed:  Medications, vital signs, orders, and nursing notes.    MEDICATIONS:  Current Outpatient Medications   Medication Sig Dispense Refill     acetaminophen (TYLENOL EX ST ARTHRITIS PAIN) 500 MG tablet Take 650 mg by mouth 3 times daily        albuterol (2.5 MG/3ML) 0.083% neb solution Take 1 vial by nebulization every 6 hours as needed        alum & mag hydroxide-simethicone (MAALOX REGULAR STRENGTH) 200-200-20 MG/5ML SUSP suspension Take 30 mLs by mouth every 2 hours as needed for indigestion        cholecalciferol (VITAMIN  -D) 1000 units capsule Take 1 capsule by mouth daily       Escitalopram Oxalate (LEXAPRO PO) Take 10 mg by mouth daily        ipratropium -  albuterol 0.5 mg/2.5 mg/3 mL (DUONEB) 0.5-2.5 (3) MG/3ML neb solution Take 1 vial by nebulization 4 times daily  90 vial 1     LORazepam (ATIVAN) 0.5 MG tablet Take 0.5 mg by mouth 2 times daily AND every 4 hours as needed       morphine 5 MG solu-tab Take 5 mg by mouth 2 times daily And every 2 hours as needed       Nutritional Supplements (NUTRITIONAL DRINK PO) 4oz BID       sennosides (SENOKOT) 8.6 MG tablet Take 1 tablet by mouth 2 times daily as needed And 1 tablet by mouth twice daily as needed       TRAZODONE HCL PO Take 100 mg by mouth At Bedtime 100mg by mouth at 10pm       triamcinolone (ARISTOCORT HP) 0.5 % external cream Apply topically daily as needed for irritation Apply to bilateral legs       LORazepam (ATIVAN) 0.5 MG tablet Take 0.5 mg by mouth At Bedtime       morphine 5 MG solu-tab Take 5 mg by mouth every 2 hours as needed for shortness of breath / dyspnea or moderate to severe pain       ROS:Limited secondary to cognitive impairment but today pt reports - see HPI    Vitals:  BP Readings from Last 3 Encounters:   04/18/19 116/73   04/09/19 116/73   02/14/19 142/78     Pulse Readings from Last 4 Encounters:   04/18/19 89   04/09/19 89   02/14/19 78   01/15/19 85     Body mass index is 21.06 kg/m .    Exam:  GENERAL APPEARANCE: awake, sitting up in the WC watching TV.   RESP:  good air entry b/l but diminished at the bases, no wheezing or coarse sound.   CV:  S1S2 nl , Irregular rate and rhythm, no murmur, rub, or gallop, no edema  ABDOMEN:  normal bowel sounds, soft, nontender,   M/S:   muscles atrophy  SKIN: blanchable erythema and cold toes.   NEURO:   no NFD appreciated on observation  PSYCH: mood and mood flat, calmer than before, poor eye contact.     Lab/Diagnostic data:   Recent labs in [facility] reviewed by me today.     ASSESSMENT/PLAN  Chronic respiratory failure, unspecified whether with hypoxia or hypercapnia (H)  Moderate Asthma, w/o complication,   - O2 dependant. At baseline   -Scheduled  DuoNeb and as needed, continue     Diastolic CHF:  - EF 60-65%, w/o LVH.   - compensated, continue meds     Insomnia/anxiety/Adjustment:  -On trazodone 100 mg, and Lexapro 5 mg, will increase lexapro to 10 mg to help with adjustment with the new situation (hospice care)     Hx of compression Fx of T12  Frail:  - WC bound but self propel  - .analgesia optimal  - Significant  Deficits requiring NH placement. Requiring extensive assistance from nursing.       cognitive impairment:  - BIMS 7/15 [Jan 2019).  - Continue to anticipate needs. Chronic condition, ongoing decline expected.   -  Continue to provide redirection and reassurance as needed. Maintain safe living situation with goals focused on comfort.    Hospice Care:  - Symptoms managed by  GNP and Hospice Team.       Orders:  - transcribed by : Vanda Quinteros  1. Increase Lexapro from 5 mg to 10 mg for on going depression  - See above, otherwise, continue the rest of the current POC.     Electronically signed by:  Leida Johnson MD

## 2019-04-17 NOTE — LETTER
4/17/2019        RE: Benson Kapoor  [facility]  [address] [facility] [address]        [facility]  Chief Complaint   Patient presents with     longterm Regulatory     [place] Medical Record Number:  [id number]  Place of Service where encounter took place:  [facility] ([facility]) [[id number]]      HPI:    Benson Kapoor  is 89 year old (5/20/1925), who is being seen today for a federally mandated E/M visit.  HPI information obtained from: facility staff, patient report and [facility] chart review.     Today's concerns are:  -  - Resident seen and examined.   - Reports  breathing is hard, uses O2 all the time, no wheezing, cough at baseline.   - reports she wishes if she is 7 feet down, added no reason to live longer. Regarding appetite, reports eats what she gets, sleep is ok. Denies pain or wheezing. Reports enjoys watching TV  - RN reports resident yells out a lot. Family does visit her daily. DON reports Resident;s memory is progressing.   - GNP reports HoTN, lasix stopped  --------------------------------  - - Past Medical, social, family histories, medications, and allergies reviewed and updated  - Medications reviewed: in the chart and EHR.   - Case Management:   I have reviewed the care plan and MDS and do agree with the plan. Patient's desire to return to the community is not present.  Information reviewed:  Medications, vital signs, orders, and nursing notes.    MEDICATIONS:  Current Outpatient Medications   Medication Sig Dispense Refill     acetaminophen (TYLENOL EX ST ARTHRITIS PAIN) 500 MG tablet Take 650 mg by mouth 3 times daily        albuterol (2.5 MG/3ML) 0.083% neb solution Take 1 vial by nebulization every 6 hours as needed        alum & mag hydroxide-simethicone (MAALOX REGULAR STRENGTH) 200-200-20 MG/5ML SUSP suspension Take 30 mLs by mouth every 2 hours as needed for indigestion        cholecalciferol (VITAMIN  -D) 1000 units capsule Take 1  capsule by mouth daily       Escitalopram Oxalate (LEXAPRO PO) Take 10 mg by mouth daily        ipratropium - albuterol 0.5 mg/2.5 mg/3 mL (DUONEB) 0.5-2.5 (3) MG/3ML neb solution Take 1 vial by nebulization 4 times daily  90 vial 1     LORazepam (ATIVAN) 0.5 MG tablet Take 0.5 mg by mouth 2 times daily AND every 4 hours as needed       morphine 5 MG solu-tab Take 5 mg by mouth 2 times daily And every 2 hours as needed       Nutritional Supplements (NUTRITIONAL DRINK PO) 4oz BID       sennosides (SENOKOT) 8.6 MG tablet Take 1 tablet by mouth 2 times daily as needed And 1 tablet by mouth twice daily as needed       TRAZODONE HCL PO Take 100 mg by mouth At Bedtime 100mg by mouth at 10pm       triamcinolone (ARISTOCORT HP) 0.5 % external cream Apply topically daily as needed for irritation Apply to bilateral legs       LORazepam (ATIVAN) 0.5 MG tablet Take 0.5 mg by mouth At Bedtime       morphine 5 MG solu-tab Take 5 mg by mouth every 2 hours as needed for shortness of breath / dyspnea or moderate to severe pain       ROS:Limited secondary to cognitive impairment but today pt reports - see HPI    Vitals:  BP Readings from Last 3 Encounters:   04/18/19 116/73   04/09/19 116/73   02/14/19 142/78     Pulse Readings from Last 4 Encounters:   04/18/19 89   04/09/19 89   02/14/19 78   01/15/19 85     Body mass index is 21.06 kg/m .    Exam:  GENERAL APPEARANCE: awake, sitting up in the WC watching TV.   RESP:  good air entry b/l but diminished at the bases, no wheezing or coarse sound.   CV:  S1S2 nl , Irregular rate and rhythm, no murmur, rub, or gallop, no edema  ABDOMEN:  normal bowel sounds, soft, nontender,   M/S:   muscles atrophy  SKIN: blanchable erythema and cold toes.   NEURO:   no NFD appreciated on observation  PSYCH: mood and mood flat, calmer than before, poor eye contact.     Lab/Diagnostic data:   Recent labs in [facility] reviewed by me today.     ASSESSMENT/PLAN  Chronic respiratory failure, unspecified whether  with hypoxia or hypercapnia (H)  Moderate Asthma, w/o complication,   - O2 dependant. At  baseline   -Scheduled DuoNeb and as needed, continue     Diastolic CHF:  - EF 60-65%, w/o LVH.   - compensated, continue meds     Insomnia/anxiety/Adjustment:  -On trazodone 100 mg, and Lexapro 5 mg, will increase lexapro to 10 mg to help with adjustment with the new situation (hospice care)     Hx of compression Fx of T12  Frail:  - WC bound but self propel  - .analgesia optimal  - Significant  Deficits requiring NH placement. Requiring extensive assistance from nursing.       cognitive impairment:  - BIMS 7/15 [Jan 2019).  - Continue to anticipate needs. Chronic condition, ongoing decline expected.   -  Continue to provide redirection and reassurance as needed. Maintain safe living situation with goals focused on comfort.    Hospice Care:  - Symptoms managed by [facility] GNP and Hospice Team.       Orders:  - transcribed by : Vanda Quinteros  1. Increase Lexapro from 5 mg to 10 mg for on going depression  - See above, otherwise, continue the rest of the current POC.     Electronically signed by:  Leida Johnson MD          Sincerely,        Leida Johnson MD

## 2019-04-18 NOTE — PROGRESS NOTES
[facility]  [place] Medical Record Number:  [id number]  Place of Service where encounter took place:  [facility] (S) [[id number]]  Chief Complaint   Patient presents with     RECHECK       HPI:    Benson Kapoor  is a 89 year old (5/20/1925), who is being seen today for an episodic care visit.  HPI information obtained from: facility chart records, facility staff and patient report. Today's concern is:    Patient needs a face 2 face visit for as needed lorazepam   She is on hospice through [facility]   She has used as needed lorazepam 3 times in the past month   Nursing notes show that it is effective in decreasing her anxiety, agitation and restlessness  She also takes 0.5 mg at bedtime   She is on lexapro in addition- which was increased yesterday be Dr. Johnson  She continues to overall show signs of decline   Spending more time resting in bed, less interactive with staff   much of her agitation occurs at bedtime and when she is alone  She is fearful of not waking up and dying   Although also expresses peace and being ready for end of life          Past Medical and Surgical History reviewed in Epic today.    MEDICATIONS:  Current Outpatient Medications   Medication Sig Dispense Refill     acetaminophen (TYLENOL EX ST ARTHRITIS PAIN) 500 MG tablet Take 650 mg by mouth 3 times daily        albuterol (2.5 MG/3ML) 0.083% neb solution Take 1 vial by nebulization every 6 hours as needed        alum & mag hydroxide-simethicone (MAALOX REGULAR STRENGTH) 200-200-20 MG/5ML SUSP suspension Take 30 mLs by mouth every 2 hours as needed for indigestion        cholecalciferol (VITAMIN  -D) 1000 units capsule Take 1 capsule by mouth daily       Escitalopram Oxalate (LEXAPRO PO) Take 10 mg by mouth daily        ipratropium - albuterol 0.5 mg/2.5 mg/3 mL (DUONEB) 0.5-2.5 (3) MG/3ML neb solution Take 1 vial by nebulization 4 times daily  90 vial 1     LORazepam (ATIVAN) 0.5 MG tablet Take 0.5 mg by mouth 2  "times daily AND every 4 hours as needed       LORazepam (ATIVAN) 0.5 MG tablet Take 0.5 mg by mouth At Bedtime       morphine 5 MG solu-tab Take 5 mg by mouth every 2 hours as needed for shortness of breath / dyspnea or moderate to severe pain       morphine 5 MG solu-tab Take 5 mg by mouth 2 times daily And every 2 hours as needed       Nutritional Supplements (NUTRITIONAL DRINK PO) 4oz BID       sennosides (SENOKOT) 8.6 MG tablet Take 1 tablet by mouth 2 times daily as needed And 1 tablet by mouth twice daily as needed       TRAZODONE HCL PO Take 100 mg by mouth At Bedtime 100mg by mouth at 10pm       triamcinolone (ARISTOCORT HP) 0.5 % external cream Apply topically daily as needed for irritation Apply to bilateral legs           REVIEW OF SYSTEMS:  Unobtainable secondary to cognitive impairment.     Objective:  /73   Pulse 89   Temp 97.6  F (36.4  C)   Resp 18   Ht 1.676 m (5' 6\")   Wt 59.2 kg (130 lb 8 oz)   SpO2 91%   BMI 21.06 kg/m    Exam:  GENERAL APPEARANCE:  Alert, in no distress, appears chronically ill  RESP:  respiratory effort and palpation of chest normal, lungs clear to auscultation   CV:  Palpation and auscultation of heart done , regular rate and rhythm, no murmur, rub, or gallop  SKIN:  Inspection of skin and subcutaneous tissue baseline, Palpation of skin and subcutaneous tissue baseline  NEURO:   Cranial nerves 2-12 are normal tested and grossly at patient's baseline  PSYCH:  memory impaired , affect and mood normal    Labs:   Labs done in SNF are in [place] EPIC. Please refer to them using EPIC/Care Everywhere.    ASSESSMENT/PLAN:  Late onset Alzheimer's disease without behavioral disturbance    Hospice care patient        transcribed by : Vanda Quinteros  Orders:  1. Continue Lorazepam 0.5 mg scheduled and PRN every 4 hours for agitation causing distress to patient. Will reassess in 60 days.    Total time spent with patient visit at the skilled nursing facility was " 15 min including patient visit and review of past records. Greater than 50% of total time spent with counseling and coordinating care due to impaired cognition  Electronically signed by:  Rajni Shah NP

## 2019-04-18 NOTE — LETTER
4/18/2019        RE: Benson Kapoor  [facility]  [address] [facility] [address]        [place] [facility]  [place] Medical Record Number:  [id number]  Place of Service where encounter took place:  [facility] ([facility]) [[id number]]  Chief Complaint   Patient presents with     RECHECK       HPI:    Benson Kapoor  is a 89 year old (5/20/1925), who is being seen today for an episodic care visit.  HPI information obtained from: facility chart records, facility staff and patient report. Today's concern is:    Patient needs a face 2 face visit for as needed lorazepam   She is on hospice through [facility]   She has used as needed lorazepam 3 times in the past month   Nursing notes show that it is effective in decreasing her anxiety, agitation and restlessness  She also takes 0.5 mg at bedtime   She is on lexapro in addition- which was increased yesterday be Dr. Johnson  She continues to overall show signs of decline   Spending more time resting in bed, less interactive with staff   much of her agitation occurs at bedtime and when she is alone  She is fearful of not waking up and dying   Although also expresses peace and being ready for end of life          Past Medical and Surgical History reviewed in Epic today.    MEDICATIONS:  Current Outpatient Medications   Medication Sig Dispense Refill     acetaminophen (TYLENOL EX ST ARTHRITIS PAIN) 500 MG tablet Take 650 mg by mouth 3 times daily        albuterol (2.5 MG/3ML) 0.083% neb solution Take 1 vial by nebulization every 6 hours as needed        alum & mag hydroxide-simethicone (MAALOX REGULAR STRENGTH) 200-200-20 MG/5ML SUSP suspension Take 30 mLs by mouth every 2 hours as needed for indigestion        cholecalciferol (VITAMIN  -D) 1000 units capsule Take 1 capsule by mouth daily       Escitalopram Oxalate (LEXAPRO PO) Take 10 mg by mouth daily        ipratropium - albuterol 0.5 mg/2.5 mg/3 mL (DUONEB) 0.5-2.5 (3) MG/3ML neb solution  "Take 1 vial by nebulization 4 times daily  90 vial 1     LORazepam (ATIVAN) 0.5 MG tablet Take 0.5 mg by mouth 2 times daily AND every 4 hours as needed       LORazepam (ATIVAN) 0.5 MG tablet Take 0.5 mg by mouth At Bedtime       morphine 5 MG solu-tab Take 5 mg by mouth every 2 hours as needed for shortness of breath / dyspnea or moderate to severe pain       morphine 5 MG solu-tab Take 5 mg by mouth 2 times daily And every 2 hours as needed       Nutritional Supplements (NUTRITIONAL DRINK PO) 4oz BID       sennosides (SENOKOT) 8.6 MG tablet Take 1 tablet by mouth 2 times daily as needed And 1 tablet by mouth twice daily as needed       TRAZODONE HCL PO Take 100 mg by mouth At Bedtime 100mg by mouth at 10pm       triamcinolone (ARISTOCORT HP) 0.5 % external cream Apply topically daily as needed for irritation Apply to bilateral legs           REVIEW OF SYSTEMS:  Unobtainable secondary to cognitive impairment.     Objective:  /73   Pulse 89   Temp 97.6  F (36.4  C)   Resp 18   Ht 1.676 m (5' 6\")   Wt 59.2 kg (130 lb 8 oz)   SpO2 91%   BMI 21.06 kg/m     Exam:  GENERAL APPEARANCE:  Alert, in no distress, appears chronically ill  RESP:  respiratory effort and palpation of chest normal, lungs clear to auscultation   CV:  Palpation and auscultation of heart done , regular rate and rhythm, no murmur, rub, or gallop  SKIN:  Inspection of skin and subcutaneous tissue baseline, Palpation of skin and subcutaneous tissue baseline  NEURO:   Cranial nerves 2-12 are normal tested and grossly at patient's baseline  PSYCH:  memory impaired , affect and mood normal    Labs:   Labs done in SNF are in [place] EPIC. Please refer to them using EPIC/Care Everywhere.    ASSESSMENT/PLAN:  Late onset Alzheimer's disease without behavioral disturbance    Hospice care patient        transcribed by : Vanda Quinteros  Orders:  1. Continue Lorazepam 0.5 mg scheduled and PRN every 4 hours for agitation causing " distress to patient. Will reassess in 60 days.    Total time spent with patient visit at the skilled nursing facility was 15 min including patient visit and review of past records. Greater than 50% of total time spent with counseling and coordinating care due to impaired cognition  Electronically signed by:  Rajni Shah NP             Sincerely,        Rajni Shah NP

## 2021-09-30 NOTE — PROGRESS NOTES
"[facility]  Chief Complaint   Patient presents with     Annual Comprehensive Nursing Home       [facility] Medical Record Number:  [id number]  Place of Service where encounter took place:  [facility] [[id number]]      HPI:    Benson Kapoor is a 89 year old  (5/20/1925), who is being seen today for an annual comprehensive visit.  HPI information obtained from: facility chart records, facility staff, patient report and family/first contact [facility] report.  Today's concerns are:  Chronic bronchitis, unspecified chronic bronchitis type (H)  O2 dependent  She is on chronic oxygen  On duo nebs four times a day   Prednisone daily   Has been doing well the past few weeks    Pulmonary hypertension (H)  Peripheral edema  Intermittent atrial fibrillation (H)  Was noted to have increase in weight, increased edema in left arm and trace in ankles   Responded well to lasix   Echocardiogram showed normal EF 60-65%, no LVH  There was a mild elevation of right arterial pressure   Incidentally noted to be in a fib rate controlled           HTN (hypertension), benign  BP Readings from Last 6 Encounters:   11/07/18 116/58   10/22/18 132/78   10/16/18 134/69   10/08/18 148/64   10/01/18 150/73   09/27/18 159/86       Based on JNC-8 goals,  patients age of 89 year old, no presence of diabetes or CKD, and goals of care goal BP is <150/90 mm Hg. Noted patients BP is lower than goal but symptoms are well controlled with lasix, continue current regimen.      YINA (generalized anxiety disorder)  Primary insomnia  Started on lexapro 2 weeks ago  Trazodone 1 month ago     Reports that she is sleeping well   Reports that she has ocassional poor moods, \"I will use words that I probably should not do but feel good\"            ALLERGIES: Review of patient's allergies indicates no known allergies.  PROBLEM LIST:  Patient Active Problem List   Diagnosis     CARDIOVASCULAR SCREENING; LDL GOAL LESS THAN 130     GERD " Neck , no lymphadenopathy (gastroesophageal reflux disease)     HTN (hypertension), benign     COPD (chronic obstructive pulmonary disease) (H)     Grief reaction     Hip fracture, right (H)     Chronic constipation     Health Care Home     Clavicle fracture     Compression fracture of twelfth thoracic vertebra with routine healing     Fall from wheelchair     Hydronephrosis     Hyperglycemia     Hypokalemia     Hypoxia     Mild persistent asthma without complication     Ribs, multiple fractures     Lower urinary tract infectious disease     Late onset Alzheimer's disease without behavioral disturbance     Pulmonary hypertension (H)     O2 dependent     Intermittent atrial fibrillation (H)     PAST MEDICAL HISTORY:  has a past medical history of A fib; Anemia due to blood loss, acute (9/5/2012); DJD (degenerative joint disease); and HTN.  PAST SURGICAL HISTORY:  has a past surgical history that includes surgical history of -  and hysterectomy, cervix status unknown.  FAMILY HISTORY: family history is not on file.  SOCIAL HISTORY:  reports that she has never smoked. She has never used smokeless tobacco. She reports that she does not drink alcohol or use illicit drugs.  IMMUNIZATIONS:  Most Recent Immunizations   Administered Date(s) Administered     FLU 6-35 months 10/19/2017     Flu, Unspecified 12/20/2005     Influenza (High Dose) 3 valent vaccine 10/19/2017     Influenza (IIV3) PF 09/01/2012     Mantoux Tuberculin Skin Test 02/19/2010     Pneumo Conj 13-V (2010&after) 12/14/2016     Pneumococcal 23 valent 02/19/2010     TD (ADULT, 7+) 05/09/2007     Above immunizations pulled from [facility]. [facility] and facility records also reconciled. Outstanding information sent to  to update [facility] .  Future immunizations are not needed at this point as all recommended immunizations are up to date.   MEDICATIONS:  Current Outpatient Prescriptions   Medication Sig Dispense Refill     acetaminophen (TYLENOL EX ST ARTHRITIS PAIN)  500 MG tablet Take 650 mg by mouth 3 times daily And 650 mg every 4 hours prn       albuterol (2.5 MG/3ML) 0.083% neb solution Take 1 vial by nebulization every 6 hours as needed        alum & mag hydroxide-simethicone (MAALOX REGULAR STRENGTH) 200-200-20 MG/5ML SUSP suspension Take 30 mLs by mouth every 2 hours as needed for indigestion       cholecalciferol (VITAMIN  -D) 1000 units capsule Take 1 capsule by mouth daily       Escitalopram Oxalate (LEXAPRO PO) Take 5 mg by mouth daily       Furosemide (LASIX PO) Take 20 mg by mouth daily       ipratropium - albuterol 0.5 mg/2.5 mg/3 mL (DUONEB) 0.5-2.5 (3) MG/3ML neb solution Take 1 vial by nebulization 4 times daily  90 vial 1     Nutritional Supplements (NUTRITIONAL DRINK PO) 4oz BID       potassium chloride (MICRO-K) 10 MEQ CR capsule Take 20 mEq by mouth daily       predniSONE (DELTASONE) 5 MG tablet Take 1 tablet (5 mg) by mouth daily       sennosides (SENOKOT) 8.6 MG tablet Take 1 tablet by mouth 2 times daily        TRAZODONE HCL PO Take 50 mg by mouth At Bedtime       Medications reviewed:  Medications reconciled to facility chart and changes were made to reflect current medications as identified as above med list. Below are the changes that were made:   Medications stopped since last EPIC medication reconciliation:   There are no discontinued medications.    Medications started since last [facility] medication reconciliation:  No orders of the defined types were placed in this encounter.        Case Management:  I have reviewed the facility/SNF care plan/MDS which was done 10/9/2018, including the falls risk, nutrition and pain screening. I also reviewed the current immunizations, and preventive care.. Patient's desire to return to the community is not present. Current Level of Care is appropriate.        Advance Directive Discussion:    I reviewed the current advanced directives as reflected in EPIC, the POLST and the facility chart, and verified the congruency  "of orders . I contacted the first party Jong and discussed the plan of Care.  I did not due to cognitive impairment review the advance directives with the resident.     Team Discussion:  I communicated with the appropriate disciplines involved with the Plan of Care:   Nursing      Patient Goal:  Patient's goal is pain control and comfort.    Information reviewed:  Medications, vital signs, orders, and nursing notes.    ROS:  4 point ROS including Respiratory, CV, GI and , other than that noted in the HPI,  is negative    Exam:  /58  Pulse 70  Temp 98.4  F (36.9  C)  Resp 18  Ht 5' 6\" (1.676 m)  Wt 147 lb (66.7 kg)  SpO2 96%  BMI 23.73 kg/m2  GENERAL APPEARANCE:  Alert, in no distress  ENT:  Mouth and posterior oropharynx normal, moist mucous membranes  EYES:  EOM, conjunctivae, lids, pupils and irises normal  RESP:  respiratory effort and palpation of chest normal, lungs clear to auscultation , no respiratory distress  CV:  Palpation and auscultation of heart done , regular rate and rhythm, no murmur, rub, or gallop  ABDOMEN:  normal bowel sounds, soft, nontender, no hepatosplenomegaly or other masses  M/S:   Gait and station abnormal wheelchair bound  Digits and nails normal  SKIN:  There is a 15 mm open area on the right anterior shin- wound base is pink, no drainage   NEURO:   Cranial nerves 2-12 are normal tested and grossly at patient's baseline  PSYCH:  memory impaired , affect and mood normal     Lab/Diagnostic data:   CBC RESULTS:   Recent Labs   Lab Test  10/11/18   0724  01/18/18   0600   WBC  6.8  11.5*   RBC  4.11  3.93   HGB  12.5  12.2   HCT  41.9  38.8   MCV  102*  99   MCH  30.4  31.0   MCHC  29.8*  31.4*   RDW  13.5  13.1   PLT  208  253       Last Basic Metabolic Panel:  Recent Labs   Lab Test  10/11/18   0724  09/20/18   0624   NA  140  137   POTASSIUM  3.8  4.0   CHLORIDE  99  98   OG  8.7  8.3*   CO2  38*  36*   BUN  13  15   CR  0.63  0.56   GLC  76  76       Liver Function " Studies -   Recent Labs   Lab Test  03/22/12   0920  12/26/11   1135   PROTTOTAL  7.0  7.6   ALBUMIN  3.6  4.0   BILITOTAL  0.4  0.5   ALKPHOS  78  76   AST  27  29   ALT  9  12       No results found for: TSH]    No results found for: A1C      ASSESSMENT/PLAN  Chronic bronchitis, unspecified chronic bronchitis type (H)  O2 dependent  Stable   ?will attempt to wean off prednisone and monitor respiratory status   Breathing improved with daily lasix      Pulmonary hypertension (H)  New diagnosis   Breathing improved with daily lasix   Will continue to monitor    Peripheral edema  Improved with lasix   Has small open area on shin   She does not like the bandage and wants it off     Intermittent atrial fibrillation (H)  new diagnosis- incidental finding on echo   Rate controlled   Not a good candidate for anticoagulation     HTN (hypertension), benign  Stable on current regimen     YINA (generalized anxiety disorder)  Primary insomnia  Improved with lexapro   May do better off steroid      Bilateral impacted cerumen  Will use debrox and irrigate ears       Orders:  1.  Ok to keep right shin open to air  2.  Debrox ear drops--4 drops to both ears BID x3 days then irrigate  3.  Change weight checks to once a week  4.  Next lab day:  CMP  5.  Decrease prednisone to 2.5mg for 1 week and then stop     Electronically signed by:  Rajni Shah NP